# Patient Record
Sex: MALE | Race: OTHER | NOT HISPANIC OR LATINO | URBAN - METROPOLITAN AREA
[De-identification: names, ages, dates, MRNs, and addresses within clinical notes are randomized per-mention and may not be internally consistent; named-entity substitution may affect disease eponyms.]

---

## 2020-10-30 ENCOUNTER — INPATIENT (INPATIENT)
Facility: HOSPITAL | Age: 75
LOS: 3 days | Discharge: HOME | DRG: 149 | End: 2020-11-03
Attending: INTERNAL MEDICINE | Admitting: INTERNAL MEDICINE
Payer: MEDICARE

## 2020-10-30 VITALS
WEIGHT: 156.53 LBS | HEART RATE: 91 BPM | DIASTOLIC BLOOD PRESSURE: 79 MMHG | HEIGHT: 65 IN | SYSTOLIC BLOOD PRESSURE: 132 MMHG | OXYGEN SATURATION: 99 % | RESPIRATION RATE: 18 BRPM | TEMPERATURE: 98 F

## 2020-10-30 LAB
ALBUMIN SERPL ELPH-MCNC: 4 G/DL — SIGNIFICANT CHANGE UP (ref 3.5–5)
ALP SERPL-CCNC: 81 U/L — SIGNIFICANT CHANGE UP (ref 40–120)
ALT FLD-CCNC: 41 U/L DA — SIGNIFICANT CHANGE UP (ref 10–60)
ANION GAP SERPL CALC-SCNC: 10 MMOL/L — SIGNIFICANT CHANGE UP (ref 5–17)
APTT BLD: 28.4 SEC — SIGNIFICANT CHANGE UP (ref 27.5–35.5)
AST SERPL-CCNC: 21 U/L — SIGNIFICANT CHANGE UP (ref 10–40)
BASOPHILS # BLD AUTO: 0 K/UL — SIGNIFICANT CHANGE UP (ref 0–0.2)
BASOPHILS NFR BLD AUTO: 0 % — SIGNIFICANT CHANGE UP (ref 0–2)
BILIRUB SERPL-MCNC: 0.6 MG/DL — SIGNIFICANT CHANGE UP (ref 0.2–1.2)
BUN SERPL-MCNC: 21 MG/DL — HIGH (ref 7–18)
CALCIUM SERPL-MCNC: 9.2 MG/DL — SIGNIFICANT CHANGE UP (ref 8.4–10.5)
CHLORIDE SERPL-SCNC: 100 MMOL/L — SIGNIFICANT CHANGE UP (ref 96–108)
CO2 SERPL-SCNC: 28 MMOL/L — SIGNIFICANT CHANGE UP (ref 22–31)
CREAT SERPL-MCNC: 1.35 MG/DL — HIGH (ref 0.5–1.3)
ELLIPTOCYTES BLD QL SMEAR: SIGNIFICANT CHANGE UP
EOSINOPHIL # BLD AUTO: 0 K/UL — SIGNIFICANT CHANGE UP (ref 0–0.5)
EOSINOPHIL NFR BLD AUTO: 0 % — SIGNIFICANT CHANGE UP (ref 0–6)
GLUCOSE SERPL-MCNC: 177 MG/DL — HIGH (ref 70–99)
HCT VFR BLD CALC: 43.9 % — SIGNIFICANT CHANGE UP (ref 39–50)
HGB BLD-MCNC: 14.4 G/DL — SIGNIFICANT CHANGE UP (ref 13–17)
INR BLD: 1.12 RATIO — SIGNIFICANT CHANGE UP (ref 0.88–1.16)
LYMPHOCYTES # BLD AUTO: 0.48 K/UL — LOW (ref 1–3.3)
LYMPHOCYTES # BLD AUTO: 5 % — LOW (ref 13–44)
MANUAL SMEAR VERIFICATION: SIGNIFICANT CHANGE UP
MCHC RBC-ENTMCNC: 27.6 PG — SIGNIFICANT CHANGE UP (ref 27–34)
MCHC RBC-ENTMCNC: 32.8 GM/DL — SIGNIFICANT CHANGE UP (ref 32–36)
MCV RBC AUTO: 84.1 FL — SIGNIFICANT CHANGE UP (ref 80–100)
MONOCYTES # BLD AUTO: 0.38 K/UL — SIGNIFICANT CHANGE UP (ref 0–0.9)
MONOCYTES NFR BLD AUTO: 4 % — SIGNIFICANT CHANGE UP (ref 2–14)
NEUTROPHILS # BLD AUTO: 8.66 K/UL — HIGH (ref 1.8–7.4)
NEUTROPHILS NFR BLD AUTO: 90 % — HIGH (ref 43–77)
NRBC # BLD: 0 /100 — SIGNIFICANT CHANGE UP (ref 0–0)
NT-PROBNP SERPL-SCNC: 40 PG/ML — SIGNIFICANT CHANGE UP (ref 0–450)
PLAT MORPH BLD: NORMAL — SIGNIFICANT CHANGE UP
PLATELET # BLD AUTO: 267 K/UL — SIGNIFICANT CHANGE UP (ref 150–400)
POIKILOCYTOSIS BLD QL AUTO: SIGNIFICANT CHANGE UP
POTASSIUM SERPL-MCNC: 3.2 MMOL/L — LOW (ref 3.5–5.3)
POTASSIUM SERPL-SCNC: 3.2 MMOL/L — LOW (ref 3.5–5.3)
PROT SERPL-MCNC: 8.3 G/DL — SIGNIFICANT CHANGE UP (ref 6–8.3)
PROTHROM AB SERPL-ACNC: 13.2 SEC — SIGNIFICANT CHANGE UP (ref 10.6–13.6)
RBC # BLD: 5.22 M/UL — SIGNIFICANT CHANGE UP (ref 4.2–5.8)
RBC # FLD: 13.6 % — SIGNIFICANT CHANGE UP (ref 10.3–14.5)
RBC BLD AUTO: ABNORMAL
SCHISTOCYTES BLD QL AUTO: SLIGHT — SIGNIFICANT CHANGE UP
SODIUM SERPL-SCNC: 138 MMOL/L — SIGNIFICANT CHANGE UP (ref 135–145)
TROPONIN I SERPL-MCNC: <0.015 NG/ML — SIGNIFICANT CHANGE UP (ref 0–0.04)
VARIANT LYMPHS # BLD: 1 % — SIGNIFICANT CHANGE UP (ref 0–6)
WBC # BLD: 9.62 K/UL — SIGNIFICANT CHANGE UP (ref 3.8–10.5)
WBC # FLD AUTO: 9.62 K/UL — SIGNIFICANT CHANGE UP (ref 3.8–10.5)

## 2020-10-30 RX ORDER — POTASSIUM CHLORIDE 20 MEQ
40 PACKET (EA) ORAL ONCE
Refills: 0 | Status: COMPLETED | OUTPATIENT
Start: 2020-10-30 | End: 2020-10-30

## 2020-10-30 RX ORDER — DIPHENHYDRAMINE HCL 50 MG
25 CAPSULE ORAL ONCE
Refills: 0 | Status: COMPLETED | OUTPATIENT
Start: 2020-10-30 | End: 2020-10-30

## 2020-10-30 RX ORDER — MECLIZINE HCL 12.5 MG
25 TABLET ORAL ONCE
Refills: 0 | Status: COMPLETED | OUTPATIENT
Start: 2020-10-30 | End: 2020-10-30

## 2020-10-30 RX ORDER — SODIUM CHLORIDE 9 MG/ML
1000 INJECTION INTRAMUSCULAR; INTRAVENOUS; SUBCUTANEOUS ONCE
Refills: 0 | Status: COMPLETED | OUTPATIENT
Start: 2020-10-30 | End: 2020-10-30

## 2020-10-30 RX ORDER — SODIUM CHLORIDE 9 MG/ML
3 INJECTION INTRAMUSCULAR; INTRAVENOUS; SUBCUTANEOUS EVERY 8 HOURS
Refills: 0 | Status: DISCONTINUED | OUTPATIENT
Start: 2020-10-30 | End: 2020-11-03

## 2020-10-30 RX ADMIN — Medication 25 MILLIGRAM(S): at 23:01

## 2020-10-30 RX ADMIN — SODIUM CHLORIDE 1000 MILLILITER(S): 9 INJECTION INTRAMUSCULAR; INTRAVENOUS; SUBCUTANEOUS at 23:01

## 2020-10-30 NOTE — ED PROVIDER NOTE - CLINICAL SUMMARY MEDICAL DECISION MAKING FREE TEXT BOX
Pt p/w vertigo and nausea worse with movement and not currently present with no other s/s of neuro or cardiac etiology. Pt allergic to contrast (vomiting) therefore unable to perform CTA however given hx and benign exam low suspicion of central vertigo, will perform CT w/o contrast. Giving fluids and meclizine with benadryl. Pt stable. Will reassess. Pt p/w vertigo and nausea worse with movement and not currently present with no other s/s of neuro or cardiac etiology. Pt allergic to contrast (vomiting) therefore unable to perform CTA however given hx and benign exam low suspicion of central vertigo, will perform CT w/o contrast. Giving fluids and meclizine with benadryl. Pt stable. Will reassess.  EKG showing new inf and lat TWIs and pt still c/o intermittent vertigo s/p meds and fluids in the ED. Labs show Cr 1.3, uncertain if baseline. Will admit for further cardiac w/u +/- MRI brain. Endorsed to MAR.

## 2020-10-30 NOTE — ED ADULT NURSE NOTE - NSIMPLEMENTINTERV_GEN_ALL_ED
Implemented All Fall Risk Interventions:  [place] to call system. Call bell, personal items and telephone within reach. Instruct patient to call for assistance. Room bathroom lighting operational. Non-slip footwear when patient is off stretcher. Physically safe environment: no spills, clutter or unnecessary equipment. Stretcher in lowest position, wheels locked, appropriate side rails in place. Provide visual cue, wrist band, yellow gown, etc. Monitor gait and stability. Monitor for mental status changes and reorient to person, place, and time. Review medications for side effects contributing to fall risk. Reinforce activity limits and safety measures with patient and family.

## 2020-10-30 NOTE — ED PROVIDER NOTE - NS ED ROS FT
Patient Reports No  Fever/Chills  Diarrhea  Urinary Symptoms  Chest Pain, Shortness of Breath  Syncope, Orthopnea  Focal Numbness or Weakness  Other Recent Illness or Hospitalizations

## 2020-10-30 NOTE — ED PROVIDER NOTE - PHYSICAL EXAMINATION
Vital Signs Reviewed  GEN: Comfortable, Conversant in full sentences, NAD  HEENT: NCAT, MMM, Neck Supple  RESP: CTAB, No rales/rhonchi/wheezing  CV: RRR, S1S2, No murmurs  ABD: No TTP, ND, No masses  Extrem/Skin: Equal pulses bilat, No cyanosis/edema/rashes  Neuro: AAOx3, CNs Grossly Intact, Nml coordinating mvmts, Equal strength/sensation in all extremities bilat, No Pronator Drift, Shuffling gait (baseline per wife and pt)

## 2020-10-30 NOTE — ED ADULT TRIAGE NOTE - CHIEF COMPLAINT QUOTE
wife states " he is dizzy and vomited, started today ", denies headache   denies contact with person known to have corona virus

## 2020-10-30 NOTE — ED PROVIDER NOTE - OBJECTIVE STATEMENT
74 yo M h/o HTN, HLD, prostate CA (no active dz, last radiation in 2016) p/w dizziness described as vertigo worse with movement since this morning and 3 x NBNB emesis over the day. Pt states that he's had numerous similar episodes to this in the past where he has vertigo with nausea that responds spontaneously and denies h/o CVA. Currently pt states that he has no vertigo or other symptoms, even with movement during the exam, and that it resolved in the ambulance with no interventions given. Pt denies decreased PO intake, ear pain or auditory symptoms, diarrhea, urinary symptoms, headache, neck stiffness, fever or infectious symptoms, syncope, chest pain, SOB, focal numbness/weakness, bloody or black stools. No other recent illness or hospitalizations.

## 2020-10-31 ENCOUNTER — TRANSCRIPTION ENCOUNTER (OUTPATIENT)
Age: 75
End: 2020-10-31

## 2020-10-31 DIAGNOSIS — Z29.9 ENCOUNTER FOR PROPHYLACTIC MEASURES, UNSPECIFIED: ICD-10-CM

## 2020-10-31 DIAGNOSIS — R42 DIZZINESS AND GIDDINESS: ICD-10-CM

## 2020-10-31 DIAGNOSIS — E78.5 HYPERLIPIDEMIA, UNSPECIFIED: ICD-10-CM

## 2020-10-31 DIAGNOSIS — N17.9 ACUTE KIDNEY FAILURE, UNSPECIFIED: ICD-10-CM

## 2020-10-31 DIAGNOSIS — I10 ESSENTIAL (PRIMARY) HYPERTENSION: ICD-10-CM

## 2020-10-31 LAB
A1C WITH ESTIMATED AVERAGE GLUCOSE RESULT: 5.9 % — HIGH (ref 4–5.6)
ALBUMIN SERPL ELPH-MCNC: 3.5 G/DL — SIGNIFICANT CHANGE UP (ref 3.5–5)
ALP SERPL-CCNC: 69 U/L — SIGNIFICANT CHANGE UP (ref 40–120)
ALT FLD-CCNC: 36 U/L DA — SIGNIFICANT CHANGE UP (ref 10–60)
ANION GAP SERPL CALC-SCNC: 7 MMOL/L — SIGNIFICANT CHANGE UP (ref 5–17)
AST SERPL-CCNC: 18 U/L — SIGNIFICANT CHANGE UP (ref 10–40)
BASOPHILS # BLD AUTO: 0.02 K/UL — SIGNIFICANT CHANGE UP (ref 0–0.2)
BASOPHILS NFR BLD AUTO: 0.2 % — SIGNIFICANT CHANGE UP (ref 0–2)
BILIRUB SERPL-MCNC: 0.5 MG/DL — SIGNIFICANT CHANGE UP (ref 0.2–1.2)
BUN SERPL-MCNC: 18 MG/DL — SIGNIFICANT CHANGE UP (ref 7–18)
CALCIUM SERPL-MCNC: 9 MG/DL — SIGNIFICANT CHANGE UP (ref 8.4–10.5)
CHLORIDE SERPL-SCNC: 104 MMOL/L — SIGNIFICANT CHANGE UP (ref 96–108)
CHLORIDE UR-SCNC: 62 MMOL/L — SIGNIFICANT CHANGE UP
CHOLEST SERPL-MCNC: 139 MG/DL — SIGNIFICANT CHANGE UP
CO2 SERPL-SCNC: 30 MMOL/L — SIGNIFICANT CHANGE UP (ref 22–31)
CREAT ?TM UR-MCNC: 83 MG/DL — SIGNIFICANT CHANGE UP
CREAT SERPL-MCNC: 1.07 MG/DL — SIGNIFICANT CHANGE UP (ref 0.5–1.3)
EOSINOPHIL # BLD AUTO: 0.03 K/UL — SIGNIFICANT CHANGE UP (ref 0–0.5)
EOSINOPHIL NFR BLD AUTO: 0.3 % — SIGNIFICANT CHANGE UP (ref 0–6)
ESTIMATED AVERAGE GLUCOSE: 123 MG/DL — HIGH (ref 68–114)
GLUCOSE SERPL-MCNC: 98 MG/DL — SIGNIFICANT CHANGE UP (ref 70–99)
HCT VFR BLD CALC: 39.5 % — SIGNIFICANT CHANGE UP (ref 39–50)
HDLC SERPL-MCNC: 63 MG/DL — SIGNIFICANT CHANGE UP
HGB BLD-MCNC: 13.2 G/DL — SIGNIFICANT CHANGE UP (ref 13–17)
IMM GRANULOCYTES NFR BLD AUTO: 0.2 % — SIGNIFICANT CHANGE UP (ref 0–1.5)
LIPID PNL WITH DIRECT LDL SERPL: 66 MG/DL — SIGNIFICANT CHANGE UP
LYMPHOCYTES # BLD AUTO: 1.05 K/UL — SIGNIFICANT CHANGE UP (ref 1–3.3)
LYMPHOCYTES # BLD AUTO: 10.7 % — LOW (ref 13–44)
MAGNESIUM SERPL-MCNC: 2.1 MG/DL — SIGNIFICANT CHANGE UP (ref 1.6–2.6)
MCHC RBC-ENTMCNC: 28 PG — SIGNIFICANT CHANGE UP (ref 27–34)
MCHC RBC-ENTMCNC: 33.4 GM/DL — SIGNIFICANT CHANGE UP (ref 32–36)
MCV RBC AUTO: 83.9 FL — SIGNIFICANT CHANGE UP (ref 80–100)
MONOCYTES # BLD AUTO: 0.72 K/UL — SIGNIFICANT CHANGE UP (ref 0–0.9)
MONOCYTES NFR BLD AUTO: 7.3 % — SIGNIFICANT CHANGE UP (ref 2–14)
NEUTROPHILS # BLD AUTO: 7.98 K/UL — HIGH (ref 1.8–7.4)
NEUTROPHILS NFR BLD AUTO: 81.3 % — HIGH (ref 43–77)
NON HDL CHOLESTEROL: 76 MG/DL — SIGNIFICANT CHANGE UP
NRBC # BLD: 0 /100 WBCS — SIGNIFICANT CHANGE UP (ref 0–0)
OSMOLALITY UR: 483 MOS/KG — SIGNIFICANT CHANGE UP (ref 50–1200)
PHOSPHATE SERPL-MCNC: 1.9 MG/DL — LOW (ref 2.5–4.5)
PLATELET # BLD AUTO: 248 K/UL — SIGNIFICANT CHANGE UP (ref 150–400)
POTASSIUM SERPL-MCNC: 3.4 MMOL/L — LOW (ref 3.5–5.3)
POTASSIUM SERPL-SCNC: 3.4 MMOL/L — LOW (ref 3.5–5.3)
PROT SERPL-MCNC: 7.4 G/DL — SIGNIFICANT CHANGE UP (ref 6–8.3)
RBC # BLD: 4.71 M/UL — SIGNIFICANT CHANGE UP (ref 4.2–5.8)
RBC # FLD: 13.8 % — SIGNIFICANT CHANGE UP (ref 10.3–14.5)
SARS-COV-2 RNA SPEC QL NAA+PROBE: SIGNIFICANT CHANGE UP
SODIUM SERPL-SCNC: 141 MMOL/L — SIGNIFICANT CHANGE UP (ref 135–145)
SODIUM UR-SCNC: 50 MMOL/L — SIGNIFICANT CHANGE UP
TRIGL SERPL-MCNC: 49 MG/DL — SIGNIFICANT CHANGE UP
TROPONIN I SERPL-MCNC: <0.015 NG/ML — SIGNIFICANT CHANGE UP (ref 0–0.04)
TSH SERPL-MCNC: 0.85 UU/ML — SIGNIFICANT CHANGE UP (ref 0.34–4.82)
WBC # BLD: 9.82 K/UL — SIGNIFICANT CHANGE UP (ref 3.8–10.5)
WBC # FLD AUTO: 9.82 K/UL — SIGNIFICANT CHANGE UP (ref 3.8–10.5)

## 2020-10-31 PROCEDURE — 70450 CT HEAD/BRAIN W/O DYE: CPT | Mod: 26

## 2020-10-31 PROCEDURE — 93880 EXTRACRANIAL BILAT STUDY: CPT | Mod: 26

## 2020-10-31 PROCEDURE — 99283 EMERGENCY DEPT VISIT LOW MDM: CPT

## 2020-10-31 RX ORDER — SIMVASTATIN 20 MG/1
10 TABLET, FILM COATED ORAL AT BEDTIME
Refills: 0 | Status: DISCONTINUED | OUTPATIENT
Start: 2020-10-31 | End: 2020-11-03

## 2020-10-31 RX ORDER — ONDANSETRON 8 MG/1
4 TABLET, FILM COATED ORAL EVERY 4 HOURS
Refills: 0 | Status: DISCONTINUED | OUTPATIENT
Start: 2020-10-31 | End: 2020-11-03

## 2020-10-31 RX ORDER — POTASSIUM CHLORIDE 20 MEQ
40 PACKET (EA) ORAL EVERY 4 HOURS
Refills: 0 | Status: COMPLETED | OUTPATIENT
Start: 2020-10-31 | End: 2020-10-31

## 2020-10-31 RX ORDER — POTASSIUM PHOSPHATE, MONOBASIC POTASSIUM PHOSPHATE, DIBASIC 236; 224 MG/ML; MG/ML
15 INJECTION, SOLUTION INTRAVENOUS ONCE
Refills: 0 | Status: DISCONTINUED | OUTPATIENT
Start: 2020-10-31 | End: 2020-10-31

## 2020-10-31 RX ORDER — INFLUENZA VIRUS VACCINE 15; 15; 15; 15 UG/.5ML; UG/.5ML; UG/.5ML; UG/.5ML
0.5 SUSPENSION INTRAMUSCULAR ONCE
Refills: 0 | Status: COMPLETED | OUTPATIENT
Start: 2020-10-31 | End: 2020-10-31

## 2020-10-31 RX ORDER — HEPARIN SODIUM 5000 [USP'U]/ML
5000 INJECTION INTRAVENOUS; SUBCUTANEOUS ONCE
Refills: 0 | Status: DISCONTINUED | OUTPATIENT
Start: 2020-10-31 | End: 2020-11-01

## 2020-10-31 RX ORDER — FAMOTIDINE 10 MG/ML
20 INJECTION INTRAVENOUS DAILY
Refills: 0 | Status: DISCONTINUED | OUTPATIENT
Start: 2020-10-31 | End: 2020-11-03

## 2020-10-31 RX ORDER — SODIUM CHLORIDE 9 MG/ML
1000 INJECTION INTRAMUSCULAR; INTRAVENOUS; SUBCUTANEOUS
Refills: 0 | Status: DISCONTINUED | OUTPATIENT
Start: 2020-10-31 | End: 2020-11-03

## 2020-10-31 RX ORDER — AMLODIPINE BESYLATE 2.5 MG/1
5 TABLET ORAL DAILY
Refills: 0 | Status: DISCONTINUED | OUTPATIENT
Start: 2020-10-31 | End: 2020-11-01

## 2020-10-31 RX ORDER — POTASSIUM PHOSPHATE, MONOBASIC POTASSIUM PHOSPHATE, DIBASIC 236; 224 MG/ML; MG/ML
15 INJECTION, SOLUTION INTRAVENOUS ONCE
Refills: 0 | Status: COMPLETED | OUTPATIENT
Start: 2020-10-31 | End: 2020-10-31

## 2020-10-31 RX ORDER — MECLIZINE HCL 12.5 MG
12.5 TABLET ORAL EVERY 6 HOURS
Refills: 0 | Status: DISCONTINUED | OUTPATIENT
Start: 2020-10-31 | End: 2020-11-03

## 2020-10-31 RX ADMIN — Medication 40 MILLIEQUIVALENT(S): at 10:17

## 2020-10-31 RX ADMIN — SODIUM CHLORIDE 3 MILLILITER(S): 9 INJECTION INTRAMUSCULAR; INTRAVENOUS; SUBCUTANEOUS at 13:46

## 2020-10-31 RX ADMIN — FAMOTIDINE 20 MILLIGRAM(S): 10 INJECTION INTRAVENOUS at 11:13

## 2020-10-31 RX ADMIN — SODIUM CHLORIDE 60 MILLILITER(S): 9 INJECTION INTRAMUSCULAR; INTRAVENOUS; SUBCUTANEOUS at 04:48

## 2020-10-31 RX ADMIN — SIMVASTATIN 10 MILLIGRAM(S): 20 TABLET, FILM COATED ORAL at 22:24

## 2020-10-31 RX ADMIN — POTASSIUM PHOSPHATE, MONOBASIC POTASSIUM PHOSPHATE, DIBASIC 62.5 MILLIMOLE(S): 236; 224 INJECTION, SOLUTION INTRAVENOUS at 10:17

## 2020-10-31 RX ADMIN — Medication 40 MILLIEQUIVALENT(S): at 13:46

## 2020-10-31 RX ADMIN — SODIUM CHLORIDE 3 MILLILITER(S): 9 INJECTION INTRAMUSCULAR; INTRAVENOUS; SUBCUTANEOUS at 06:02

## 2020-10-31 RX ADMIN — AMLODIPINE BESYLATE 5 MILLIGRAM(S): 2.5 TABLET ORAL at 05:43

## 2020-10-31 RX ADMIN — SODIUM CHLORIDE 3 MILLILITER(S): 9 INJECTION INTRAMUSCULAR; INTRAVENOUS; SUBCUTANEOUS at 21:28

## 2020-10-31 RX ADMIN — SODIUM CHLORIDE 3 MILLILITER(S): 9 INJECTION INTRAMUSCULAR; INTRAVENOUS; SUBCUTANEOUS at 00:04

## 2020-10-31 RX ADMIN — Medication 40 MILLIEQUIVALENT(S): at 01:24

## 2020-10-31 NOTE — H&P ADULT - ASSESSMENT
76 yo M, from home, self ambulatory with PMH OF   HTN, HLD, prostate CA ( s/p Radz,  last radiation in 2016) p/w dizziness x 2 days. Pt was in his usual state of health until2 days ago and then developed sudden onset of Vertigo when he woke up from sleep.      In  the ED,   Pt is AAOX3, no signs of any distress  Vitals- 132/76, Hr 92, afebrile  K+ 3.2, replaced with 40 meq KCL   Cr 1.3  CT head - no acute changes  EKG- t wave inversions- v4-v6, III and avf  Trops x1 negative

## 2020-10-31 NOTE — H&P ADULT - HISTORY OF PRESENT ILLNESS
76 yo M, from home, self ambulatory with PMH OF   HTN, HLD, prostate CA ( s/p Radz,  last radiation in 2016) p/w dizziness x 2 days. Pt was in his usual state of health until2 days ago and then developed sudden onset of Vertigo when he woke up from sleep. He Describes his vertigo be be worsened  with movement since this morning. He also endorses Nausea  associated with  3  episodes of  NBNB emesis over the day. Pt states that he's had numerous similar episodes to this in the past where he has vertigo with nausea that responds spontaneously and denies h/o CVA. Pt usually walks without any assistance but has limited his activity due to the vertigo. Currently pt states that he has no vertigo or other symptoms, even with movement during the exam, and that it resolved in the ambulance with no interventions given. Pt had a similar presentation 5 years ago and was hospitalized.  Pt denies decreased PO intake, ear pain, tinnitus,  or auditory symptoms, diarrhea, urinary symptoms, headache, neck stiffness, fever or infectious symptoms, syncope, chest pain, SOB, focal numbness.   Pt denies any smoking, alcohol or any form of substance abuse.    In  the ED,   Pt is AAOX3, no signs of any distress  Vitals- 132/76, Hr 92, afebrile  K+ 3.2, replaced with 40 meq KCL   Cr 1.3  CT head - no acute changes  EKG- t wave inversions- v4-v6, III and avf  Trops x1 negative

## 2020-10-31 NOTE — H&P ADULT - PROBLEM SELECTOR PLAN 3
Pt has PMH of HTN   Home meds- losartan/hctz and amlodipine   c/w amlodipine   holding Losartan/hctz for shakeel

## 2020-10-31 NOTE — H&P ADULT - PROBLEM SELECTOR PLAN 1
Pt admitted for Vertigo worsening with positional changes x2 days.  Pt denies any fall, LOC, Tinnitus   CT head- no acute changes   Pt has no active vertigo in ED   Neuro consult- Dr. Fatima Pt admitted for Vertigo worsening with positional changes x2 days.  Pt denies any fall, LOC, Tinnitus   CT head- no acute changes   Pt has no active vertigo in ED   Neuro consult- Dr. Izquierdo

## 2020-10-31 NOTE — H&P ADULT - NSHPLABSRESULTS_GEN_ALL_CORE
14.4   9.62  )-----------( 267      ( 30 Oct 2020 23:09 )             43.9       10-30    138  |  100  |  21<H>  ----------------------------<  177<H>  3.2<L>   |  28  |  1.35<H>    Ca    9.2      30 Oct 2020 23:09    TPro  8.3  /  Alb  4.0  /  TBili  0.6  /  DBili  x   /  AST  21  /  ALT  41  /  AlkPhos  81  10-30                  PT/INR - ( 30 Oct 2020 23:09 )   PT: 13.2 sec;   INR: 1.12 ratio         PTT - ( 30 Oct 2020 23:09 )  PTT:28.4 sec    Lactate Trend      CARDIAC MARKERS ( 30 Oct 2020 23:09 )  <0.015 ng/mL / x     / x     / x     / x            CAPILLARY BLOOD GLUCOSE          c< from: CT Head No Cont (10.31.20 @ 01:11) >    Impression: No CT evidence of acute intracranial abnormality.    < end of copied text >

## 2020-10-31 NOTE — CONSULT NOTE ADULT - SUBJECTIVE AND OBJECTIVE BOX
CHIEF COMPLAINT:Patient is a 75y old  Male who presents with a chief complaint of Vertigo.      HPI:  74 yo M, from home, self ambulatory with PMH OF   HTN, HLD, prostate CA ( s/p Radz,  last radiation in 2016) p/w dizziness x 2 days. Pt was in his usual state of health until2 days ago and then developed sudden onset of Vertigo when he woke up from sleep. He Describes his vertigo be be worsened  with movement since this morning. He also endorses Nausea  associated with  3  episodes of  NBNB emesis over the day. Pt states that he's had numerous similar episodes to this in the past where he has vertigo with nausea that responds spontaneously and denies h/o CVA. Pt usually walks without any assistance but has limited his activity due to the vertigo. Currently pt states that he has no vertigo or other symptoms, even with movement during the exam, and that it resolved in the ambulance with no interventions given. Pt had a similar presentation 5 years ago and was hospitalized.  Pt denies decreased PO intake, ear pain, tinnitus,  or auditory symptoms, diarrhea, urinary symptoms, headache, neck stiffness, fever or infectious symptoms, syncope, chest pain, SOB, focal numbness.   Pt denies any smoking, alcohol or any form of substance abuse.    In  the ED,   Pt is AAOX3, no signs of any distress  Vitals- 132/76, Hr 92, afebrile  K+ 3.2, replaced with 40 meq KCL   Cr 1.3  CT head - no acute changes  EKG- t wave inversions- v4-v6, III and avf  Trops x1 negative  (31 Oct 2020 03:26)      PAST MEDICAL & SURGICAL HISTORY:  Prostate cancer  s/p radiotherapy in Nov 2015    HTN (hypertension)    HLD (hyperlipidemia)        MEDICATIONS  (STANDING):  amLODIPine   Tablet 5 milliGRAM(s) Oral daily  famotidine    Tablet 20 milliGRAM(s) Oral daily  heparin   Injectable 5000 Unit(s) IV Push once  influenza   Vaccine 0.5 milliLiter(s) IntraMuscular once  potassium chloride    Tablet ER 40 milliEquivalent(s) Oral every 4 hours  potassium phosphate IVPB 15 milliMole(s) IV Intermittent once  simvastatin 10 milliGRAM(s) Oral at bedtime  sodium chloride 0.9% lock flush 3 milliLiter(s) IV Push every 8 hours  sodium chloride 0.9%. 1000 milliLiter(s) (60 mL/Hr) IV Continuous <Continuous>    MEDICATIONS  (PRN):  meclizine 12.5 milliGRAM(s) Oral every 6 hours PRN vertigo  ondansetron Injectable 4 milliGRAM(s) IV Push every 4 hours PRN Nausea and/or Vomiting      FAMILY HISTORY:No hx of CAD      SOCIAL HISTORY:    [x ] Non-smoker    [x ] Alcohol-denies    Allergies    No Known Allergies    Intolerances    	    REVIEW OF SYSTEMS:  CONSTITUTIONAL: No fever, weight loss, or fatigue  EYES: No eye pain, visual disturbances, or discharge  ENT:  No difficulty hearing, tinnitus, vertigo; No sinus or throat pain  NECK: No pain or stiffness  RESPIRATORY: No cough, wheezing, chills or hemoptysis; No Shortness of Breath  CARDIOVASCULAR: No chest pain, palpitations, passing out,+ dizziness  GASTROINTESTINAL: No abdominal or epigastric pain. No nausea, vomiting, or hematemesis; No diarrhea or constipation. No melena or hematochezia.  GENITOURINARY: No dysuria, frequency, hematuria, or incontinence  NEUROLOGICAL: No headaches, memory loss, loss of strength, numbness, or tremors  SKIN: No itching, burning, rashes, or lesions   LYMPH Nodes: No enlarged glands  ENDOCRINE: No heat or cold intolerance; No hair loss  MUSCULOSKELETAL: No joint pain or swelling; No muscle, back, or extremity pain  PSYCHIATRIC: No depression, anxiety, mood swings, or difficulty sleeping  HEME/LYMPH: No easy bruising, or bleeding gums  ALLERGY AND IMMUNOLOGIC: No hives or eczema	      PHYSICAL EXAM:  T(C): 36.8 (10-31-20 @ 07:27), Max: 36.8 (10-31-20 @ 07:27)  HR: 75 (10-31-20 @ 07:27) (75 - 97)  BP: 136/73 (10-31-20 @ 07:27) (132/74 - 145/84)  RR: 18 (10-31-20 @ 07:27) (17 - 19)  SpO2: 99% (10-31-20 @ 07:27) (98% - 100%)        Appearance: Normal	  HEENT:   Normal oral mucosa, PERRL, EOMI	  Lymphatic: No lymphadenopathy  Cardiovascular: Normal S1 S2, No JVD, No murmurs, No edema  Respiratory: Lungs clear to auscultation	  Psychiatry: A & O x 3, Mood & affect appropriate  Gastrointestinal:  Soft, Non-tender, + BS	  Skin: No rashes, No ecchymoses, No cyanosis	  Neurologic: Non-focal  Extremities: Normal range of motion, No clubbing, cyanosis or edema  Vascular: Peripheral pulses palpable 2+ bilaterally    	    ECG:  NSR with t wave inversion serge-lateral leads	  	  	  LABS:	 	      CARDIAC MARKERS ( 31 Oct 2020 05:44 )  <0.015 ng/mL / x     / x     / x     / x      CARDIAC MARKERS ( 30 Oct 2020 23:09 )  <0.015 ng/mL / x     / x     / x     / x                                  13.2   9.82  )-----------( 248      ( 31 Oct 2020 05:44 )             39.5     10-31    141  |  104  |  18  ----------------------------<  98  3.4<L>   |  30  |  1.07    Ca    9.0      31 Oct 2020 05:44  Phos  1.9     10-31  Mg     2.1     10-31    TPro  7.4  /  Alb  3.5  /  TBili  0.5  /  DBili  x   /  AST  18  /  ALT  36  /  AlkPhos  69  10-31    proBNP: Serum Pro-Brain Natriuretic Peptide: 40 pg/mL (10-30 @ 23:09)    Lipid Profile: Cholesterol 139  LDL --  HDL 63  TG 49      TSH: Thyroid Stimulating Hormone, Serum: 0.85 uU/mL (10-31 @ 05:44)      rad< from: CT Head No Cont (10.31.20 @ 01:11) >  EXAM:  CT BRAIN                            PROCEDURE DATE:  10/31/2020          INTERPRETATION:  Clinical Indication: Vertigo.    Comparison: 9/11/2011    Technique: Noncontrast axial CT images of the head were acquired. Coronal and sagittal reformats were obtained.    Findings:  The ventricles and sulci are prominent in size, compatible with mild generalized parenchymal volume loss. No acute intracranial hemorrhage is identified.  No extra-axial fluid collection is identified.  No mass effect or midline shift is seen.  There is no evidence of acute territorial infarct.  There are periventricular and subcortical white matter hypodensities, which are nonspecific, but likely reflect mild microvascular ischemic disease.  There is scattered pansinus mucosal thickening. The mastoid air cells are well aerated.  No acute osseous abnormality is seen.      Impression: No CT evidence of acute intracranial abnormality.            AUGUSTO ARRINGTON MD; Attending Radiologist  This document has been electronically signed. Oct 31 2020  1:38AM    < end of copied text >

## 2020-10-31 NOTE — PHYSICAL THERAPY INITIAL EVALUATION ADULT - CRITERIA FOR SKILLED THERAPEUTIC INTERVENTIONS
impairments found/risk reduction/prevention/therapy frequency/functional limitations in following categories/predicted duration of therapy intervention/rehab potential/anticipated discharge recommendation

## 2020-10-31 NOTE — DISCHARGE NOTE PROVIDER - NSDCMRMEDTOKEN_GEN_ALL_CORE_FT
amLODIPine 5 mg oral tablet: 1 tab(s) orally once a day  losartan-hydrochlorothiazide 100 mg-25 mg oral tablet: 1 tab(s) orally once a day  simvastatin 10 mg oral tablet: 1 tab(s) orally once a day (at bedtime)   amLODIPine 5 mg oral tablet: 1 tab(s) orally once a day  aspirin 81 mg oral delayed release tablet: 1 tab(s) orally once a day  meclizine 12.5 mg oral tablet: 1 tab(s) orally prn, As Needed -vertigo   metoprolol tartrate 25 mg oral tablet: 1 tab(s) orally 2 times a day  simvastatin 10 mg oral tablet: 1 tab(s) orally once a day (at bedtime)

## 2020-10-31 NOTE — DISCHARGE NOTE PROVIDER - NSDCCPCAREPLAN_GEN_ALL_CORE_FT
PRINCIPAL DISCHARGE DIAGNOSIS  Diagnosis: Vertigo  Assessment and Plan of Treatment: You came to the hospital with complaints of dizziness. You were admitted to exclude any neurological or cardiological causes. EKG was normal sinus rhytm.  Echocardiogram showed ***  Your CT head was negative for any bleed. Ultrasound of the neck vessels was negative for any narrowing.      SECONDARY DISCHARGE DIAGNOSES  Diagnosis: Acute kidney injury  Assessment and Plan of Treatment: You presented with elevated creatinine likely due to  severe dehydration. It improved during your course. Please continue oral hydration as much as possible within the daily drinking limit of 2 L per day.       PRINCIPAL DISCHARGE DIAGNOSIS  Diagnosis: Vertigo  Assessment and Plan of Treatment: You came to the hospital with complaints of dizziness. You were admitted to exclude any neurological or cardiological causes. EKG was normal sinus rhythm. Echocardiogram showed normal functioning and no valvular pathologies. Stress test was negative for Ischemia in the heart. Cardiology Dr. Hugo. Your CT head was negative for any bleed. MRI brain and MR Internal auditory canals were normal. Ultrasound of the neck vessels was negative for any narrowing. Neurologist Dr. Izquierdo followed. Follow up with your primary care physician in a week.      SECONDARY DISCHARGE DIAGNOSES  Diagnosis: Acute kidney injury  Assessment and Plan of Treatment: You presented with elevated creatinine likely due to  severe dehydration. It improved during your course. Please continue oral hydration as much as possible within the daily drinking limit of 2 L per day.       PRINCIPAL DISCHARGE DIAGNOSIS  Diagnosis: Vertigo  Assessment and Plan of Treatment: You came to the hospital with complaints of dizziness. You were admitted to exclude any neurological or cardiological causes. EKG was normal sinus rhythm. Echocardiogram showed normal functioning and no valvular pathologies. Stress test was negative for Ischemia in the heart. Cardiology Dr. Hugo. Your CT head was negative for any bleed. MRI brain and MR Internal auditory canals were normal. Ultrasound of the neck vessels was negative for any narrowing. Neurologist Dr. Izquierdo followed. You were treated with aspirin, metoprolol, meclizine, and statin. Follow up with your primary care physician in a week.      SECONDARY DISCHARGE DIAGNOSES  Diagnosis: Acute kidney injury  Assessment and Plan of Treatment: You presented with elevated creatinine likely due to  severe dehydration. It improved during your course. Please continue oral hydration as much as possible within the daily drinking limit of 2 L per day.

## 2020-10-31 NOTE — PHYSICAL THERAPY INITIAL EVALUATION ADULT - GENERAL OBSERVATIONS, REHAB EVAL
Pt. received supine in bed, NAD, on telemetry. Pt. cooperative and motivated during eval. Pt. A&O x 3.

## 2020-10-31 NOTE — H&P ADULT - PROBLEM SELECTOR PLAN 2
Pt  on admission has mild Cr elevation 1.3  LINDSEY likely from dehydration   s/p 1L NS bolus in ED   f/u bmp and urine lytes in am

## 2020-10-31 NOTE — DISCHARGE NOTE PROVIDER - HOSPITAL COURSE
76 yo M, from home, self ambulatory with PMH OF   HTN, HLD, prostate CA ( s/p Radz,  last radiation in 2016) p/w dizziness x 2 days. Pt was in his usual state of health until2 days ago and then developed sudden onset of Vertigo when he woke up from sleep. He Describes his vertigo be be worsened  with movement since this morning. He also endorses Nausea  associated with  3  episodes of  NBNB emesis over the day. Pt states that he's had numerous similar episodes to this in the past where he has vertigo with nausea that responds spontaneously and denies h/o CVA. Pt usually walks without any assistance but has limited his activity due to the vertigo. Currently pt states that he has no vertigo or other symptoms, even with movement during the exam, and that it resolved in the ambulance with no interventions given. Pt had a similar presentation 5 years ago and was hospitalized.  Pt denies decreased PO intake, ear pain, tinnitus,  or auditory symptoms, diarrhea, urinary symptoms, headache, neck stiffness, fever or infectious symptoms, syncope, chest pain, SOB, focal numbness.   Pt denies any smoking, alcohol or any form of substance abuse. 74 yo M, from home, self ambulatory with PMH OF   HTN, HLD, prostate CA ( s/p Radz,  last radiation in 2016) p/w dizziness x 2 days. Pt was in his usual state of health until2 days ago and then developed sudden onset of Vertigo when he woke up from sleep. He Describes his vertigo be be worsened  with movement since this morning. He also endorses Nausea  associated with  3  episodes of  NBNB emesis over the day. Pt states that he's had numerous similar episodes to this in the past where he has vertigo with nausea that responds spontaneously and denies h/o CVA. Pt usually walks without any assistance but has limited his activity due to the vertigo. Currently pt states that he has no vertigo or other symptoms, even with movement during the exam, and that it resolved in the ambulance with no interventions given. Pt had a similar presentation 5 years ago and was hospitalized.  Pt denies decreased PO intake, ear pain, tinnitus,  or auditory symptoms, diarrhea, urinary symptoms, headache, neck stiffness, fever or infectious symptoms, syncope, chest pain, SOB, focal numbness. Pt denies any smoking, alcohol or any form of substance abuse. For the vertigo, EKG showed sinus rhythm with 1st degree A-V block, ST & T wave abnormality(anterolateral ischemia), On Tele monitoring, Orthostats negative, Stress test was normal, Cardiology Dr. Baca followed. CT head showed no acute changes, US doppler carotid negative, MRI head and MR IAC was normal, Neuro consult- Dr. Izquierdo. 74 yo M, from home, self ambulatory with PMH OF   HTN, HLD, prostate CA ( s/p Radz,  last radiation in 2016) p/w dizziness x 2 days. Pt was in his usual state of health until2 days ago and then developed sudden onset of Vertigo when he woke up from sleep. He Describes his vertigo be be worsened  with movement since this morning. He also endorses Nausea  associated with  3  episodes of  NBNB emesis over the day. Pt states that he's had numerous similar episodes to this in the past where he has vertigo with nausea that responds spontaneously and denies h/o CVA. Pt usually walks without any assistance but has limited his activity due to the vertigo. Currently pt states that he has no vertigo or other symptoms, even with movement during the exam, and that it resolved in the ambulance with no interventions given. Pt had a similar presentation 5 years ago and was hospitalized.  Pt denies decreased PO intake, ear pain, tinnitus,  or auditory symptoms, diarrhea, urinary symptoms, headache, neck stiffness, fever or infectious symptoms, syncope, chest pain, SOB, focal numbness. Pt denies any smoking, alcohol or any form of substance abuse. For the vertigo, EKG showed sinus rhythm with 1st degree A-V block, ST & T wave abnormality(anterolateral ischemia), On Tele monitoring, Orthostats negative, Stress test was normal, Cardiology Dr. Baca followed. CT head showed no acute changes, US doppler carotid negative, MRI head and MR IAC was normal, Neuro consult- Dr. Izquierdo.  Patient is stable for discharge per attending and is advised to follow up with PCP as outpatient  Please refer to patient's complete medical chart with documents for a full hospital course, for this is only a brief summary.

## 2020-10-31 NOTE — DISCHARGE NOTE PROVIDER - CARE PROVIDER_API CALL
[place] [address]  Phone: [phone]  Fax: [phone]  Follow Up Time: 1 week

## 2020-10-31 NOTE — H&P ADULT - ATTENDING COMMENTS
76 yo M, from home, self ambulatory with PMH OF   HTN, HLD, prostate CA ( s/p Radz,  last radiation in 2016) p/w dizziness x 2 days. Pt was in his usual state of health until2 days ago and then developed sudden onset of Vertigo when he woke up from sleep. He Describes his vertigo be be worsened  with movement since this morning. He also endorses Nausea  associated with  3  episodes of  NBNB emesis over the day. Pt states that he's had numerous similar episodes to this in the past where he has vertigo with nausea that responds spontaneously and denies h/o CVA. Pt usually walks without any assistance but has limited his activity due to the vertigo. Currently pt states that he has no vertigo or other symptoms, even with movement during the exam, and that it resolved in the ambulance with no interventions given. Pt had a similar presentation 5 years ago and was hospitalized.  Pt denies decreased PO intake, ear pain, tinnitus,  or auditory symptoms, diarrhea, urinary symptoms, headache, neck stiffness, fever or infectious symptoms, syncope, chest pain, SOB, focal numbness.   Pt denies any smoking, alcohol or any form of substance abuse.    In  the ED,   Pt is AAOX3, no signs of any distress  Vitals- 132/76, Hr 92, afebrile  K+ 3.2, replaced with 40 meq KCL   Cr 1.3  CT head - no acute changes  EKG- t wave inversions- v4-v6, III and avf  Trops x1 negative       assessment   --- vertigo, ecg changes r/o acs, r/o cns patho, shakeel, hypokalemia, h/o HTN, HLD, prostate CA ( s/p Radz,  last radiation in 2016)     plan  --  adm to tele, aspirin, statin, meclizine prn, cont preadmit home meds, gi and dvt profilaxis, supplement potassium, ivf  cbc, bmp, mg, phos, lipid, tsh, ce q8 x3    orthostatic bp q8hrs    echo  carotid duplex    cardio cons  neuro cons.

## 2020-10-31 NOTE — CONSULT NOTE ADULT - ASSESSMENT
74 yo M, from home, self ambulatory with PMH OF   HTN, HLD, prostate CA ( s/p Radz,  last radiation in 2016) p/w dizziness x 2 days. Pt was in his usual state of health until2 days ago and then developed sudden onset of Vertigo when he woke up from sleep,abnormal EKG,s/p LINDSEY.  1.Tele monitoring.  2.Orthostatic bp q shift.  3.Replace k+.  4.Echocardiogram.  5.HTN-Norvasc.  6.Lipid d/o-statin.  7.GI and DVT prophylaxis.

## 2020-10-31 NOTE — H&P ADULT - NSHPPHYSICALEXAM_GEN_ALL_CORE
Vital Signs (24 Hrs):  T(C): 36.5 (10-31-20 @ 00:54), Max: 36.5 (10-31-20 @ 00:54)  HR: 92 (10-31-20 @ 00:54) (91 - 92)  BP: 132/76 (10-31-20 @ 00:54) (132/76 - 132/79)  RR: 18 (10-31-20 @ 00:54) (18 - 18)  SpO2: 98% (10-31-20 @ 00:54) (98% - 99%)  Wt(kg): --  Daily Height in cm: 165.1 (30 Oct 2020 21:55)    Daily     I&O's Summary

## 2020-10-31 NOTE — PHYSICAL THERAPY INITIAL EVALUATION ADULT - PERTINENT HX OF CURRENT PROBLEM, REHAB EVAL
Pt. was in his usual state of health until 2 days PTA when he developed sudden onset of vertigo when he woke up from sleep. No CT evidence of acute intracranial abnormality.

## 2020-11-01 LAB
ALBUMIN SERPL ELPH-MCNC: 3.1 G/DL — LOW (ref 3.5–5)
ALP SERPL-CCNC: 62 U/L — SIGNIFICANT CHANGE UP (ref 40–120)
ALT FLD-CCNC: 32 U/L DA — SIGNIFICANT CHANGE UP (ref 10–60)
ANION GAP SERPL CALC-SCNC: 6 MMOL/L — SIGNIFICANT CHANGE UP (ref 5–17)
AST SERPL-CCNC: 27 U/L — SIGNIFICANT CHANGE UP (ref 10–40)
BASOPHILS # BLD AUTO: 0.06 K/UL — SIGNIFICANT CHANGE UP (ref 0–0.2)
BASOPHILS NFR BLD AUTO: 0.8 % — SIGNIFICANT CHANGE UP (ref 0–2)
BILIRUB SERPL-MCNC: 0.6 MG/DL — SIGNIFICANT CHANGE UP (ref 0.2–1.2)
BUN SERPL-MCNC: 13 MG/DL — SIGNIFICANT CHANGE UP (ref 7–18)
CALCIUM SERPL-MCNC: 8.8 MG/DL — SIGNIFICANT CHANGE UP (ref 8.4–10.5)
CHLORIDE SERPL-SCNC: 107 MMOL/L — SIGNIFICANT CHANGE UP (ref 96–108)
CO2 SERPL-SCNC: 30 MMOL/L — SIGNIFICANT CHANGE UP (ref 22–31)
CREAT SERPL-MCNC: 0.96 MG/DL — SIGNIFICANT CHANGE UP (ref 0.5–1.3)
EOSINOPHIL # BLD AUTO: 0.46 K/UL — SIGNIFICANT CHANGE UP (ref 0–0.5)
EOSINOPHIL NFR BLD AUTO: 6.3 % — HIGH (ref 0–6)
GLUCOSE SERPL-MCNC: 81 MG/DL — SIGNIFICANT CHANGE UP (ref 70–99)
HCT VFR BLD CALC: 38 % — LOW (ref 39–50)
HGB BLD-MCNC: 12.3 G/DL — LOW (ref 13–17)
IMM GRANULOCYTES NFR BLD AUTO: 0.3 % — SIGNIFICANT CHANGE UP (ref 0–1.5)
LYMPHOCYTES # BLD AUTO: 1.2 K/UL — SIGNIFICANT CHANGE UP (ref 1–3.3)
LYMPHOCYTES # BLD AUTO: 16.3 % — SIGNIFICANT CHANGE UP (ref 13–44)
MAGNESIUM SERPL-MCNC: 2.4 MG/DL — SIGNIFICANT CHANGE UP (ref 1.6–2.6)
MCHC RBC-ENTMCNC: 27.8 PG — SIGNIFICANT CHANGE UP (ref 27–34)
MCHC RBC-ENTMCNC: 32.4 GM/DL — SIGNIFICANT CHANGE UP (ref 32–36)
MCV RBC AUTO: 85.8 FL — SIGNIFICANT CHANGE UP (ref 80–100)
MONOCYTES # BLD AUTO: 0.63 K/UL — SIGNIFICANT CHANGE UP (ref 0–0.9)
MONOCYTES NFR BLD AUTO: 8.6 % — SIGNIFICANT CHANGE UP (ref 2–14)
NEUTROPHILS # BLD AUTO: 4.98 K/UL — SIGNIFICANT CHANGE UP (ref 1.8–7.4)
NEUTROPHILS NFR BLD AUTO: 67.7 % — SIGNIFICANT CHANGE UP (ref 43–77)
NRBC # BLD: 0 /100 WBCS — SIGNIFICANT CHANGE UP (ref 0–0)
PHOSPHATE SERPL-MCNC: 2.3 MG/DL — LOW (ref 2.5–4.5)
PLATELET # BLD AUTO: 234 K/UL — SIGNIFICANT CHANGE UP (ref 150–400)
POTASSIUM SERPL-MCNC: 3.8 MMOL/L — SIGNIFICANT CHANGE UP (ref 3.5–5.3)
POTASSIUM SERPL-SCNC: 3.8 MMOL/L — SIGNIFICANT CHANGE UP (ref 3.5–5.3)
PROT SERPL-MCNC: 6.6 G/DL — SIGNIFICANT CHANGE UP (ref 6–8.3)
RBC # BLD: 4.43 M/UL — SIGNIFICANT CHANGE UP (ref 4.2–5.8)
RBC # FLD: 14.2 % — SIGNIFICANT CHANGE UP (ref 10.3–14.5)
SARS-COV-2 IGG SERPL QL IA: NEGATIVE — SIGNIFICANT CHANGE UP
SARS-COV-2 IGM SERPL IA-ACNC: 0.09 INDEX — SIGNIFICANT CHANGE UP
SODIUM SERPL-SCNC: 143 MMOL/L — SIGNIFICANT CHANGE UP (ref 135–145)
WBC # BLD: 7.35 K/UL — SIGNIFICANT CHANGE UP (ref 3.8–10.5)
WBC # FLD AUTO: 7.35 K/UL — SIGNIFICANT CHANGE UP (ref 3.8–10.5)

## 2020-11-01 PROCEDURE — 99223 1ST HOSP IP/OBS HIGH 75: CPT

## 2020-11-01 RX ORDER — ASPIRIN/CALCIUM CARB/MAGNESIUM 324 MG
81 TABLET ORAL DAILY
Refills: 0 | Status: DISCONTINUED | OUTPATIENT
Start: 2020-11-01 | End: 2020-11-03

## 2020-11-01 RX ORDER — METOPROLOL TARTRATE 50 MG
25 TABLET ORAL
Refills: 0 | Status: DISCONTINUED | OUTPATIENT
Start: 2020-11-01 | End: 2020-11-03

## 2020-11-01 RX ORDER — HEPARIN SODIUM 5000 [USP'U]/ML
5000 INJECTION INTRAVENOUS; SUBCUTANEOUS EVERY 12 HOURS
Refills: 0 | Status: DISCONTINUED | OUTPATIENT
Start: 2020-11-01 | End: 2020-11-03

## 2020-11-01 RX ADMIN — Medication 25 MILLIGRAM(S): at 17:29

## 2020-11-01 RX ADMIN — AMLODIPINE BESYLATE 5 MILLIGRAM(S): 2.5 TABLET ORAL at 06:22

## 2020-11-01 RX ADMIN — Medication 81 MILLIGRAM(S): at 11:31

## 2020-11-01 RX ADMIN — FAMOTIDINE 20 MILLIGRAM(S): 10 INJECTION INTRAVENOUS at 11:31

## 2020-11-01 RX ADMIN — SODIUM CHLORIDE 3 MILLILITER(S): 9 INJECTION INTRAMUSCULAR; INTRAVENOUS; SUBCUTANEOUS at 13:39

## 2020-11-01 RX ADMIN — SODIUM CHLORIDE 3 MILLILITER(S): 9 INJECTION INTRAMUSCULAR; INTRAVENOUS; SUBCUTANEOUS at 21:05

## 2020-11-01 RX ADMIN — SIMVASTATIN 10 MILLIGRAM(S): 20 TABLET, FILM COATED ORAL at 21:06

## 2020-11-01 RX ADMIN — HEPARIN SODIUM 5000 UNIT(S): 5000 INJECTION INTRAVENOUS; SUBCUTANEOUS at 17:29

## 2020-11-01 RX ADMIN — SODIUM CHLORIDE 3 MILLILITER(S): 9 INJECTION INTRAMUSCULAR; INTRAVENOUS; SUBCUTANEOUS at 05:51

## 2020-11-01 NOTE — CONSULT NOTE ADULT - ATTENDING COMMENTS
I counseled the patient about his likely diagnosis, and the further testing and medication indicated for evaluation and management of vertigo.

## 2020-11-01 NOTE — CONSULT NOTE ADULT - SUBJECTIVE AND OBJECTIVE BOX
NEUROLOGY CONSULT NOTE    NAME:  ANTONI REBOLLEDO      ASSESSMENT:  75 RHM with benign paroxysmal positional vertigo, less likely posterior circulation stroke      RECOMMENDATIONS:    - Continue meclizine 12.5mg as needed for vertigo - can decrease PRN frequency from Q6H to Q8H as symptoms improve    - PT/OT for vestibular therapy    - MRI Brain & IAC w/wo Gadolinium approved to evaluate for stroke or IAC pathology    - Routine follow-up with Otolaryngology or Neurology          *******************************      CHIEF COMPLAINT:  Patient is a 75y old  Male who presents with a chief complaint of Vertigo (01 Nov 2020 10:28)      HPI:  74 yo M, from home, self ambulatory with PMH OF   HTN, HLD, prostate CA ( s/p Radz,  last radiation in 2016) p/w dizziness x 2 days. Pt was in his usual state of health until2 days ago and then developed sudden onset of Vertigo when he woke up from sleep. He Describes his vertigo be worsened  with movement since this morning. He also endorses Nausea  associated with  3  episodes of  NBNB emesis over the day. Pt states that he's had numerous similar episodes to this in the past where he has vertigo with nausea that responds spontaneously and denies h/o CVA. Pt usually walks without any assistance but has limited his activity due to the vertigo. Currently pt states that he has no vertigo or other symptoms, even with movement during the exam, and that it resolved in the ambulance with no interventions given. Pt had a similar presentation 5 years ago and was hospitalized.  Pt denies decreased PO intake, ear pain, tinnitus,  or auditory symptoms, diarrhea, urinary symptoms, headache, neck stiffness, fever or infectious symptoms, syncope, chest pain, SOB, focal numbness.   Pt denies any smoking, alcohol or any form of substance abuse.  In  the ED,   Pt is AAOX3, no signs of any distress  Vitals- 132/76, Hr 92, afebrile  K+ 3.2, replaced with 40 meq KCL   Cr 1.3  CT head - no acute changes  EKG- t wave inversions- v4-v6, III and avf  Trops x1 negative  (31 Oct 2020 03:26)    NEURO HPI:  75 RHM who reports having a previous diagnosis of vertigo, has been experiencing recurrence of room-spinning sensation, especially when lying down or when trying to get up from a lying position, although not while sitting, standing, or walking. Since admission and since receiving PRN meclizine, his symptoms have improved. He denies any other neurological symptoms.    PAST MEDICAL & SURGICAL HISTORY:  Prostate cancer  S/p radiotherapy in Nov 2015  HTN (hypertension)  HLD (hyperlipidemia)    MEDICATIONS:  aspirin enteric coated 81 milliGRAM(s) Oral daily  famotidine    Tablet 20 milliGRAM(s) Oral daily  heparin   Injectable 5000 Unit(s) SubCutaneous every 12 hours  meclizine 12.5 milliGRAM(s) Oral every 6 hours PRN  metoprolol tartrate 25 milliGRAM(s) Oral two times a day  ondansetron Injectable 4 milliGRAM(s) IV Push every 4 hours PRN  simvastatin 10 milliGRAM(s) Oral at bedtime  sodium chloride 0.9% lock flush 3 milliLiter(s) IV Push every 8 hours  sodium chloride 0.9%. 1000 milliLiter(s) IV Continuous <Continuous>    ALLERGIES:  No Known Allergies    FAMILY HISTORY:  Denies family history of vertigo    SOCIAL HISTORY:  Denies alcohol, tobacco, and illicit drug use    REVIEW OF SYSTEMS:  GENERAL: No fever, weight changes, fatigue  EYES: No eye pain or discharge  EAR/NOSE/MOUTH/THROAT: No sinus or throat pain; No difficulty hearing  NECK: No pain or stiffness  RESPIRATORY: No cough, wheezing, chills, or hemoptysis  CARDIOVASCULAR: No chest pain, palpitations, shortness of breath, or dyspnea on exertion  GASTROINTESTINAL: No abdominal pain, nausea, vomiting, hematemesis, diarrhea, or constipation  GENITOURINARY: No dysuria, frequency, hematuria, or incontinence  SKIN: No rashes or lesions  ENDOCRINE: No heat or cold intolerance  HEMATOLOGIC: No easy bruising or bleeding  PSYCHIATRIC: No depression, anxiety, or mood swings  MUSCULOSKELETAL: No joint pain or swelling  NEUROLOGICAL: As per HPI      OBJECTIVE:    Vital Signs Last 24 Hrs  T(C): 37.3 (01 Nov 2020 19:52), Max: 37.3 (01 Nov 2020 19:52)  T(F): 99.1 (01 Nov 2020 19:52), Max: 99.1 (01 Nov 2020 19:52)  HR: 61 (01 Nov 2020 19:52) (61 - 87)  BP: 143/72 (01 Nov 2020 19:52) (138/73 - 152/75)  RR: 18 (01 Nov 2020 19:52) (18 - 18)  SpO2: 97% (01 Nov 2020 19:52) (97% - 100%)    General Examination:  General: No acute distress  HEENT: Atraumatic, Normocephalic  Respiratory: CTA B/l.  No crackles, rhonchi, or wheezes.  Cardiovascular: RRR.  Normal S1 & S2.  Normal b/l radial and pedal pulses.    Neurological Examination:  General / Mental Status: AAO x 3.  No aphasia or dysarthria.  Naming and repetition intact.  Cranial Nerves: VFF x 4.  PERRL.  EOMI x 2, No nystagmus or diplopia.  B/l V1-V3 equal and intact to light touch and pinprick.  Symmetric facial movement and palate elevation.  B/l hearing equal to finger rub.  Negative [place]-Hallpike maneuver b/l.  5/5 strength with b/l sternocleidomastoid & trapezius.  Midline tongue protrusion, with no atrophy or fasciculations.  Motor: Normal bulk & tone in all four extremities.  5/5 strength throughout all four extremities.  No downward drift, rigidity, spasticity, or tremors in any of the four extremities.  Sensory: Intact to light touch and pinprick in all four extremities.  Negative Romberg.  Reflex: 2+ and symmetric at b/l biceps, triceps, brachioradialis, patellae, and ankles.  Downgoing toes b/l.  Coordination: No dysmetria with b/l finger-to-nose and heel raise tests.  Symmetric rapid alternating movements b/l.  Gait: Normal, narrow-based gait.  No difficulty with tiptoe, heel, and tandem gaits.    NIHSS 0  MRS 1        LABORATORY VALUES:                        12.3   7.35  )-----------( 234      ( 01 Nov 2020 06:43 )             38.0       11-01    143  |  107  |  13  ----------------------------<  81  3.8   |  30  |  0.96    Ca    8.8      01 Nov 2020 06:43  Phos  2.3     11-01  Mg     2.4     11-01    TPro  6.6  /  Alb  3.1<L>  /  TBili  0.6  /  DBili  x   /  AST  27  /  ALT  32  /  AlkPhos  62  11-01    10-31 Chol 139 LDL -- HDL 63 Trig 49        NEUROIMAGING:    CT Head (10/31/20):  - No acute intracranial abnormality  - Chronic microvascular disease  - Mild diffuse atrophy          Please contact the Neurology consult service with any questions.    Amadou Rebolledo MD   of Neurology  [facility] [employer] [facility]

## 2020-11-01 NOTE — PROGRESS NOTE ADULT - SUBJECTIVE AND OBJECTIVE BOX
CHIEF COMPLAINT:Patient is a 75y old  Male who presents with a chief complaint of Vertigo.Pt appears comfortable.    	  REVIEW OF SYSTEMS:  CONSTITUTIONAL: No fever, weight loss, or fatigue  EYES: No eye pain, visual disturbances, or discharge  ENT:  No difficulty hearing, tinnitus, vertigo; No sinus or throat pain  NECK: No pain or stiffness  RESPIRATORY: No cough, wheezing, chills or hemoptysis; No Shortness of Breath  CARDIOVASCULAR: No chest pain, palpitations, passing out, dizziness, or leg swelling  GASTROINTESTINAL: No abdominal or epigastric pain. No nausea, vomiting, or hematemesis; No diarrhea or constipation. No melena or hematochezia.  GENITOURINARY: No dysuria, frequency, hematuria, or incontinence  NEUROLOGICAL: No headaches, memory loss, loss of strength, numbness, or tremors  SKIN: No itching, burning, rashes, or lesions   LYMPH Nodes: No enlarged glands  ENDOCRINE: No heat or cold intolerance; No hair loss  MUSCULOSKELETAL: No joint pain or swelling; No muscle, back, or extremity pain  PSYCHIATRIC: No depression, anxiety, mood swings, or difficulty sleeping  HEME/LYMPH: No easy bruising, or bleeding gums  ALLERGY AND IMMUNOLOGIC: No hives or eczema	      PHYSICAL EXAM:  T(C): 36.6 (11-01-20 @ 07:59), Max: 37.1 (10-31-20 @ 11:05)  HR: 69 (11-01-20 @ 04:13) (69 - 94)  BP: 152/75 (11-01-20 @ 04:13) (136/71 - 152/88)  RR: 18 (11-01-20 @ 07:59) (18 - 18)  SpO2: 100% (11-01-20 @ 07:59) (99% - 100%)  Wt(kg): --  I&O's Summary    31 Oct 2020 08:01  -  01 Nov 2020 07:00  --------------------------------------------------------  IN: 0 mL / OUT: 100 mL / NET: -100 mL        Appearance: Normal	  HEENT:   Normal oral mucosa, PERRL, EOMI	  Lymphatic: No lymphadenopathy  Cardiovascular: Normal S1 S2, No JVD, No murmurs, No edema  Respiratory: Lungs clear to auscultation	  Psychiatry: A & O x 3, Mood & affect appropriate  Gastrointestinal:  Soft, Non-tender, + BS	  Skin: No rashes, No ecchymoses, No cyanosis	  Neurologic: Non-focal  Extremities: Normal range of motion, No clubbing, cyanosis or edema  Vascular: Peripheral pulses palpable 2+ bilaterally    MEDICATIONS  (STANDING):  aspirin enteric coated 81 milliGRAM(s) Oral daily  famotidine    Tablet 20 milliGRAM(s) Oral daily  heparin   Injectable 5000 Unit(s) SubCutaneous every 12 hours  influenza   Vaccine 0.5 milliLiter(s) IntraMuscular once  metoprolol tartrate 25 milliGRAM(s) Oral two times a day  simvastatin 10 milliGRAM(s) Oral at bedtime  sodium chloride 0.9% lock flush 3 milliLiter(s) IV Push every 8 hours  sodium chloride 0.9%. 1000 milliLiter(s) (60 mL/Hr) IV Continuous <Continuous>      TELEMETRY: 	nsr    	  	  LABS:	 	  CARDIAC MARKERS ( 31 Oct 2020 05:44 )  <0.015 ng/mL / x     / x     / x     / x      CARDIAC MARKERS ( 30 Oct 2020 23:09 )  <0.015 ng/mL / x     / x     / x     / x                                    12.3   7.35  )-----------( 234      ( 01 Nov 2020 06:43 )             38.0     11-01    143  |  107  |  13  ----------------------------<  81  3.8   |  30  |  0.96    Ca    8.8      01 Nov 2020 06:43  Phos  2.3     11-01  Mg     2.4     11-01    TPro  6.6  /  Alb  3.1<L>  /  TBili  0.6  /  DBili  x   /  AST  27  /  ALT  32  /  AlkPhos  62  11-01    proBNP: Serum Pro-Brain Natriuretic Peptide: 40 pg/mL (10-30 @ 23:09)    Lipid Profile: Cholesterol 139  LDL --  HDL 63  TG 49      TSH: Thyroid Stimulating Hormone, Serum: 0.85 uU/mL (10-31 @ 05:44)      	    Transthoracic Echocardiogram   OBSERVATIONS:  Mitral Valve: Normal mitral valve. Moderate mitral  regurgitation.  Aortic Root: Aortic Root: 3.5 cm.  Aortic Valve: Normal trileaflet aortic valve.  Left Atrium:LA volume index = 14 cc/m2.  Left Ventricle: Normal Left Ventricular Systolic Function,  (EF = 55 to 60%) Normal left ventricular internal  dimensions and wall thicknesses. Normal diastolic function.  Right Heart: Normal right atrium. Normal right ventricular  size and systolic function (TAPSE  2.2cm). Normal tricuspid  valve. There is mild pulmonic regurgitation.  Pericardium/PleuraNormal pericardium with no pericardial  effusion.  Hemodynamic: RV systolic pressure is normal at  33 mm Hg.

## 2020-11-01 NOTE — PROGRESS NOTE ADULT - SUBJECTIVE AND OBJECTIVE BOX
Patient is a 75y old  Male who presents with a chief complaint of Vertigo (31 Oct 2020 17:55)    pt seen in icu [  ], reg med floor [   ], bed [  ], chair at bedside [   ], a+o x3 [  ], lethargic [  ],  nad [  ]    roman [  ], ngt [  ], peg [  ], et tube [  ], cent line [  ], picc line [  ]        Allergies    No Known Allergies        Vitals    T(F): 98.2 (11-01-20 @ 04:13), Max: 98.7 (10-31-20 @ 11:05)  HR: 69 (11-01-20 @ 04:13) (69 - 94)  BP: 152/75 (11-01-20 @ 04:13) (136/71 - 152/88)  RR: 18 (11-01-20 @ 04:13) (18 - 18)  SpO2: 99% (11-01-20 @ 04:13) (99% - 100%)  Wt(kg): --  CAPILLARY BLOOD GLUCOSE          Labs                          13.2   9.82  )-----------( 248      ( 31 Oct 2020 05:44 )             39.5       10-31    141  |  104  |  18  ----------------------------<  98  3.4<L>   |  30  |  1.07    Ca    9.0      31 Oct 2020 05:44  Phos  1.9     10-31  Mg     2.1     10-31    TPro  7.4  /  Alb  3.5  /  TBili  0.5  /  DBili  x   /  AST  18  /  ALT  36  /  AlkPhos  69  10-31      CARDIAC MARKERS ( 31 Oct 2020 05:44 )  <0.015 ng/mL / x     / x     / x     / x      CARDIAC MARKERS ( 30 Oct 2020 23:09 )  <0.015 ng/mL / x     / x     / x     / x                Radiology Results      Meds    MEDICATIONS  (STANDING):  amLODIPine   Tablet 5 milliGRAM(s) Oral daily  famotidine    Tablet 20 milliGRAM(s) Oral daily  heparin   Injectable 5000 Unit(s) IV Push once  influenza   Vaccine 0.5 milliLiter(s) IntraMuscular once  simvastatin 10 milliGRAM(s) Oral at bedtime  sodium chloride 0.9% lock flush 3 milliLiter(s) IV Push every 8 hours  sodium chloride 0.9%. 1000 milliLiter(s) (60 mL/Hr) IV Continuous <Continuous>      MEDICATIONS  (PRN):  meclizine 12.5 milliGRAM(s) Oral every 6 hours PRN vertigo  ondansetron Injectable 4 milliGRAM(s) IV Push every 4 hours PRN Nausea and/or Vomiting      Physical Exam    Neuro :  no focal deficits  Respiratory: CTA B/L  CV: RRR, S1S2, no murmurs,   Abdominal: Soft, NT, ND +BS,  Extremities: No edema, + peripheral pulses    ASSESSMENT    Dizziness and giddiness    Prostate cancer    HTN (hypertension)    HLD (hyperlipidemia)        PLAN     Patient is a 75y old  Male who presents with a chief complaint of Vertigo (31 Oct 2020 17:55)    pt seen in tele [x  ], reg med floor [   ], bed [ x ], chair at bedside [   ], a+o x3 [ x, lethargic [  ],  nad [x  ]        Allergies    No Known Allergies        Vitals    T(F): 98.2 (11-01-20 @ 04:13), Max: 98.7 (10-31-20 @ 11:05)  HR: 69 (11-01-20 @ 04:13) (69 - 94)  BP: 152/75 (11-01-20 @ 04:13) (136/71 - 152/88)  RR: 18 (11-01-20 @ 04:13) (18 - 18)  SpO2: 99% (11-01-20 @ 04:13) (99% - 100%)  Wt(kg): --  CAPILLARY BLOOD GLUCOSE          Labs                          13.2   9.82  )-----------( 248      ( 31 Oct 2020 05:44 )             39.5       10-31    141  |  104  |  18  ----------------------------<  98  3.4<L>   |  30  |  1.07    Ca    9.0      31 Oct 2020 05:44  Phos  1.9     10-31  Mg     2.1     10-31    TPro  7.4  /  Alb  3.5  /  TBili  0.5  /  DBili  x   /  AST  18  /  ALT  36  /  AlkPhos  69  10-31      CARDIAC MARKERS ( 31 Oct 2020 05:44 )  <0.015 ng/mL / x     / x     / x     / x      CARDIAC MARKERS ( 30 Oct 2020 23:09 )  <0.015 ng/mL / x     / x     / x     / x                Radiology Results    < from: US Duplex Carotid Arteries Complete, Bilateral (10.31.20 @ 11:09) >  IMPRESSION:    No evidence of hemodynamically significant internal carotid artery stenosis.    Atherosclerosis of the carotid arteries is present.    < end of copied text >    < from: CT Head No Cont (10.31.20 @ 01:11) >  Impression: No CT evidence of acute intracranial abnormality.    < end of copied text >      Meds    MEDICATIONS  (STANDING):  amLODIPine   Tablet 5 milliGRAM(s) Oral daily  famotidine    Tablet 20 milliGRAM(s) Oral daily  heparin   Injectable 5000 Unit(s) IV Push once  influenza   Vaccine 0.5 milliLiter(s) IntraMuscular once  simvastatin 10 milliGRAM(s) Oral at bedtime  sodium chloride 0.9% lock flush 3 milliLiter(s) IV Push every 8 hours  sodium chloride 0.9%. 1000 milliLiter(s) (60 mL/Hr) IV Continuous <Continuous>      MEDICATIONS  (PRN):  meclizine 12.5 milliGRAM(s) Oral every 6 hours PRN vertigo  ondansetron Injectable 4 milliGRAM(s) IV Push every 4 hours PRN Nausea and/or Vomiting      Physical Exam    Neuro :  no focal deficits  Respiratory: CTA B/L  CV: RRR, S1S2, no murmurs,   Abdominal: Soft, NT, ND +BS,  Extremities: No edema, + peripheral pulses    ASSESSMENT    vertigo,   r/o cns patho,  ecg changes r/o acs,   s/p shakeel,   s/p hypokalemia,   h/o prostate CA ( s/p Radz,  last radiation in 2016)   Prostate cancer  HTN (hypertension)  HLD (hyperlipidemia)        PLAN      cont tele,   cont aspirin, statin,   meclizine prn,   ct head with No CT evidence of acute intracranial abnormality noted above.  neuro cons  carotid duplex with No CT evidence of acute intracranial abnormality noted above.  cardio f/u   ce q8 x 2 neg noted above  f/u orthostatic bp q8hrs  cont norvasc  f/u echo  phys tx eval noted and rec physical tx 1-2x/week While in house  cont current meds

## 2020-11-02 DIAGNOSIS — R94.31 ABNORMAL ELECTROCARDIOGRAM [ECG] [EKG]: ICD-10-CM

## 2020-11-02 LAB
ALBUMIN SERPL ELPH-MCNC: 3.1 G/DL — LOW (ref 3.5–5)
ALP SERPL-CCNC: 63 U/L — SIGNIFICANT CHANGE UP (ref 40–120)
ALT FLD-CCNC: 33 U/L DA — SIGNIFICANT CHANGE UP (ref 10–60)
ANION GAP SERPL CALC-SCNC: 6 MMOL/L — SIGNIFICANT CHANGE UP (ref 5–17)
AST SERPL-CCNC: 28 U/L — SIGNIFICANT CHANGE UP (ref 10–40)
BASOPHILS # BLD AUTO: 0.04 K/UL — SIGNIFICANT CHANGE UP (ref 0–0.2)
BASOPHILS NFR BLD AUTO: 0.6 % — SIGNIFICANT CHANGE UP (ref 0–2)
BILIRUB SERPL-MCNC: 0.7 MG/DL — SIGNIFICANT CHANGE UP (ref 0.2–1.2)
BUN SERPL-MCNC: 17 MG/DL — SIGNIFICANT CHANGE UP (ref 7–18)
CALCIUM SERPL-MCNC: 8.7 MG/DL — SIGNIFICANT CHANGE UP (ref 8.4–10.5)
CHLORIDE SERPL-SCNC: 108 MMOL/L — SIGNIFICANT CHANGE UP (ref 96–108)
CO2 SERPL-SCNC: 29 MMOL/L — SIGNIFICANT CHANGE UP (ref 22–31)
CREAT SERPL-MCNC: 1.05 MG/DL — SIGNIFICANT CHANGE UP (ref 0.5–1.3)
EOSINOPHIL # BLD AUTO: 0.5 K/UL — SIGNIFICANT CHANGE UP (ref 0–0.5)
EOSINOPHIL NFR BLD AUTO: 8.1 % — HIGH (ref 0–6)
GLUCOSE SERPL-MCNC: 82 MG/DL — SIGNIFICANT CHANGE UP (ref 70–99)
HCT VFR BLD CALC: 37 % — LOW (ref 39–50)
HGB BLD-MCNC: 12.2 G/DL — LOW (ref 13–17)
IMM GRANULOCYTES NFR BLD AUTO: 0.2 % — SIGNIFICANT CHANGE UP (ref 0–1.5)
LYMPHOCYTES # BLD AUTO: 1.19 K/UL — SIGNIFICANT CHANGE UP (ref 1–3.3)
LYMPHOCYTES # BLD AUTO: 19.2 % — SIGNIFICANT CHANGE UP (ref 13–44)
MAGNESIUM SERPL-MCNC: 2.2 MG/DL — SIGNIFICANT CHANGE UP (ref 1.6–2.6)
MCHC RBC-ENTMCNC: 28.4 PG — SIGNIFICANT CHANGE UP (ref 27–34)
MCHC RBC-ENTMCNC: 33 GM/DL — SIGNIFICANT CHANGE UP (ref 32–36)
MCV RBC AUTO: 86 FL — SIGNIFICANT CHANGE UP (ref 80–100)
MONOCYTES # BLD AUTO: 0.57 K/UL — SIGNIFICANT CHANGE UP (ref 0–0.9)
MONOCYTES NFR BLD AUTO: 9.2 % — SIGNIFICANT CHANGE UP (ref 2–14)
NEUTROPHILS # BLD AUTO: 3.89 K/UL — SIGNIFICANT CHANGE UP (ref 1.8–7.4)
NEUTROPHILS NFR BLD AUTO: 62.7 % — SIGNIFICANT CHANGE UP (ref 43–77)
NRBC # BLD: 0 /100 WBCS — SIGNIFICANT CHANGE UP (ref 0–0)
PHOSPHATE SERPL-MCNC: 2.5 MG/DL — SIGNIFICANT CHANGE UP (ref 2.5–4.5)
PLATELET # BLD AUTO: 238 K/UL — SIGNIFICANT CHANGE UP (ref 150–400)
POTASSIUM SERPL-MCNC: 3.5 MMOL/L — SIGNIFICANT CHANGE UP (ref 3.5–5.3)
POTASSIUM SERPL-SCNC: 3.5 MMOL/L — SIGNIFICANT CHANGE UP (ref 3.5–5.3)
PROT SERPL-MCNC: 6.5 G/DL — SIGNIFICANT CHANGE UP (ref 6–8.3)
RBC # BLD: 4.3 M/UL — SIGNIFICANT CHANGE UP (ref 4.2–5.8)
RBC # FLD: 14.1 % — SIGNIFICANT CHANGE UP (ref 10.3–14.5)
SODIUM SERPL-SCNC: 143 MMOL/L — SIGNIFICANT CHANGE UP (ref 135–145)
WBC # BLD: 6.2 K/UL — SIGNIFICANT CHANGE UP (ref 3.8–10.5)
WBC # FLD AUTO: 6.2 K/UL — SIGNIFICANT CHANGE UP (ref 3.8–10.5)

## 2020-11-02 PROCEDURE — 70551 MRI BRAIN STEM W/O DYE: CPT | Mod: 26,76

## 2020-11-02 RX ADMIN — Medication 81 MILLIGRAM(S): at 13:06

## 2020-11-02 RX ADMIN — HEPARIN SODIUM 5000 UNIT(S): 5000 INJECTION INTRAVENOUS; SUBCUTANEOUS at 17:57

## 2020-11-02 RX ADMIN — Medication 25 MILLIGRAM(S): at 17:57

## 2020-11-02 RX ADMIN — SODIUM CHLORIDE 3 MILLILITER(S): 9 INJECTION INTRAMUSCULAR; INTRAVENOUS; SUBCUTANEOUS at 13:07

## 2020-11-02 RX ADMIN — SODIUM CHLORIDE 3 MILLILITER(S): 9 INJECTION INTRAMUSCULAR; INTRAVENOUS; SUBCUTANEOUS at 21:13

## 2020-11-02 RX ADMIN — FAMOTIDINE 20 MILLIGRAM(S): 10 INJECTION INTRAVENOUS at 13:06

## 2020-11-02 RX ADMIN — HEPARIN SODIUM 5000 UNIT(S): 5000 INJECTION INTRAVENOUS; SUBCUTANEOUS at 06:09

## 2020-11-02 RX ADMIN — SODIUM CHLORIDE 3 MILLILITER(S): 9 INJECTION INTRAMUSCULAR; INTRAVENOUS; SUBCUTANEOUS at 06:10

## 2020-11-02 RX ADMIN — SIMVASTATIN 10 MILLIGRAM(S): 20 TABLET, FILM COATED ORAL at 21:13

## 2020-11-02 RX ADMIN — Medication 25 MILLIGRAM(S): at 06:10

## 2020-11-02 NOTE — PROGRESS NOTE ADULT - PROBLEM SELECTOR PLAN 3
Pt  on admission has mild Cr elevation 1.3  LINDSEY likely from dehydration   Creat today is 1.05, resolved Pt  on admission has mild Cr elevation 1.3  LINDSEY likely from dehydration   Cr 11/02 is 1.05, resolved

## 2020-11-02 NOTE — PROGRESS NOTE ADULT - PROBLEM SELECTOR PLAN 2
EKG showed sinus rhythm with 1st degree A-V block, ST & T wave abnormality(anterolateral ischemia)  On Tele monitoring  Orthostats negative  f/u Stress test  Cardiology Dr. Baca following

## 2020-11-02 NOTE — PROGRESS NOTE ADULT - SUBJECTIVE AND OBJECTIVE BOX
PGY-1 Progress Note discussed with attending    PAGER #: [phone]] TILL 5:00 PM  PLEASE CONTACT ON CALL TEAM:  - On Call Team (Please refer to [place]) FROM 5:00 PM - 8:30PM  - Nightfloat Team FROM 8:30 -7:30 AM    CHIEF COMPLAINT & BRIEF HOSPITAL COURSE: 74 yo M, from home, self ambulatory with PMH OF   HTN, HLD, prostate CA ( s/p Radz,  last radiation in 2016) p/w dizziness x 2 days. Pt was in his usual state of health until2 days ago and then developed sudden onset of Vertigo when he woke up from sleep. He Describes his vertigo be be worsened  with movement since this morning. He also endorses Nausea  associated with  3  episodes of  NBNB emesis over the day. Pt states that he's had numerous similar episodes to this in the past where he has vertigo with nausea that responds spontaneously and denies h/o CVA. Pt usually walks without any assistance but has limited his activity due to the vertigo. Currently pt states that he has no vertigo or other symptoms, even with movement during the exam, and that it resolved in the ambulance with no interventions given. Pt had a similar presentation 5 years ago and was hospitalized.  Pt denies decreased PO intake, ear pain, tinnitus,  or auditory symptoms, diarrhea, urinary symptoms, headache, neck stiffness, fever or infectious symptoms, syncope, chest pain, SOB, focal numbness. Pt denies any smoking, alcohol or any form of substance abuse.  For the vertigo, EKG showed sinus rhythm with 1st degree A-V block, ST & T wave abnormality(anterolateral ischemia), On Tele monitoring, Orthostats negative, f/u Stress test, Cardiology Dr. Baca following CT head showed no acute changes, US doppler carotid negative, f/u MRI head and MR IAC, Neuro consult- Dr. Izquierdo    INTERVAL HPI/OVERNIGHT EVENTS: No adverse events overnight    REVIEW OF SYSTEMS:  CONSTITUTIONAL: No fever, weight loss, or fatigue  RESPIRATORY: No cough, wheezing, chills or hemoptysis; No shortness of breath  CARDIOVASCULAR: No chest pain, palpitations, dizziness, or leg swelling  GASTROINTESTINAL: No abdominal pain. No nausea, vomiting, or hematemesis; No diarrhea or constipation. No melena or hematochezia.  GENITOURINARY: No dysuria or hematuria, urinary frequency  NEUROLOGICAL: No headaches, memory loss, loss of strength, numbness, or tremors  SKIN: No itching, burning, rashes, or lesions     Vital Signs Last 24 Hrs  T(C): 36.6 (02 Nov 2020 11:09), Max: 37.3 (01 Nov 2020 19:52)  T(F): 97.8 (02 Nov 2020 11:09), Max: 99.1 (01 Nov 2020 19:52)  HR: 71 (02 Nov 2020 11:09) (55 - 78)  BP: 137/83 (02 Nov 2020 11:09) (137/83 - 151/77)  BP(mean): --  RR: 18 (02 Nov 2020 11:09) (18 - 18)  SpO2: 100% (02 Nov 2020 11:09) (97% - 100%)    PHYSICAL EXAMINATION:  GENERAL: NAD, well built  HEAD:  Atraumatic, Normocephalic  EYES:  conjunctiva and sclera clear  NECK: Supple, No JVD, Normal thyroid  CHEST/LUNG: Clear to auscultation. Clear to percussion bilaterally; No rales, rhonchi, wheezing, or rubs  HEART: Regular rate and rhythm; No murmurs, rubs, or gallops  ABDOMEN: Soft, Nontender, Nondistended; Bowel sounds present  NERVOUS SYSTEM:  Alert & Oriented X3,    EXTREMITIES:  2+ Peripheral Pulses, No clubbing, cyanosis, or edema  SKIN: warm dry                          12.2   6.20  )-----------( 238      ( 02 Nov 2020 06:31 )             37.0     11-02    143  |  108  |  17  ----------------------------<  82  3.5   |  29  |  1.05    Ca    8.7      02 Nov 2020 06:31  Phos  2.5     11-02  Mg     2.2     11-02    TPro  6.5  /  Alb  3.1<L>  /  TBili  0.7  /  DBili  x   /  AST  28  /  ALT  33  /  AlkPhos  63  11-02    LIVER FUNCTIONS - ( 02 Nov 2020 06:31 )  Alb: 3.1 g/dL / Pro: 6.5 g/dL / ALK PHOS: 63 U/L / ALT: 33 U/L DA / AST: 28 U/L / GGT: x           CAPILLARY BLOOD GLUCOSE    RADIOLOGY & ADDITIONAL TESTS:

## 2020-11-02 NOTE — PROGRESS NOTE ADULT - PROBLEM SELECTOR PLAN 1
Pt admitted for Vertigo worsening with positional changes  CT head- no acute changes   US doppler carotid negative  f/u MRI head and MR IAC  Neuro consult- Dr. Izquierdo

## 2020-11-02 NOTE — PROGRESS NOTE ADULT - PROBLEM SELECTOR PLAN 5
Pt has PMH of HLD  Home meds- simvastatin 10mg   Lipid profile is nml  c/w home meds Pt has PMH of HLD  Home meds- simvastatin 10mg   Lipid profile is WNL  c/w home meds

## 2020-11-02 NOTE — PROGRESS NOTE ADULT - ASSESSMENT
74 yo M, from home, self ambulatory with PMH OF   HTN, HLD, prostate CA ( s/p Radz,  last radiation in 2016) p/w dizziness x 2 days. Pt was in his usual state of health until2 days ago and then developed sudden onset of Vertigo when he woke up from sleep. Admitted for vertigo workup.

## 2020-11-02 NOTE — PROGRESS NOTE ADULT - ASSESSMENT
74 yo M, from home, self ambulatory with PMH OF   HTN, HLD, prostate CA ( s/p Radz,  last radiation in 2016) p/w dizziness x 2 days. Pt was in his usual state of health until2 days ago and then developed sudden onset of Vertigo when he woke up from sleep,abnormal EKG,s/p LINDSEY.  1.Tele monitoring.  2.Orthostatic bp -  3.Stress test -r/o ischemia.  4.HTN-b blocker.  5.Lipid d/o-statin.  6.Neurology eval noted, await MRI.  7.GI and DVT prophylaxis.

## 2020-11-02 NOTE — PROGRESS NOTE ADULT - SUBJECTIVE AND OBJECTIVE BOX
CHIEF COMPLAINT:Patient is a 75y old  Male who presents with a chief complaint of Vertigo.Pt appears comfortable.    	  REVIEW OF SYSTEMS:  CONSTITUTIONAL: No fever, weight loss, or fatigue  EYES: No eye pain, visual disturbances, or discharge  ENT:  No difficulty hearing, tinnitus, vertigo; No sinus or throat pain  NECK: No pain or stiffness  RESPIRATORY: No cough, wheezing, chills or hemoptysis; No Shortness of Breath  CARDIOVASCULAR: No chest pain, palpitations, passing out, dizziness, or leg swelling  GASTROINTESTINAL: No abdominal or epigastric pain. No nausea, vomiting, or hematemesis; No diarrhea or constipation. No melena or hematochezia.  GENITOURINARY: No dysuria, frequency, hematuria, or incontinence  NEUROLOGICAL: No headaches, memory loss, loss of strength, numbness, or tremors  SKIN: No itching, burning, rashes, or lesions   LYMPH Nodes: No enlarged glands  ENDOCRINE: No heat or cold intolerance; No hair loss  MUSCULOSKELETAL: No joint pain or swelling; No muscle, back, or extremity pain  PSYCHIATRIC: No depression, anxiety, mood swings, or difficulty sleeping  HEME/LYMPH: No easy bruising, or bleeding gums  ALLERGY AND IMMUNOLOGIC: No hives or eczema	    PHYSICAL EXAM:  T(C): 36.6 (11-02-20 @ 07:26), Max: 37.3 (11-01-20 @ 19:52)  HR: 55 (11-02-20 @ 07:26) (55 - 87)  BP: 151/77 (11-02-20 @ 07:26) (138/73 - 151/77)  RR: 18 (11-02-20 @ 07:26) (18 - 18)  SpO2: 100% (11-02-20 @ 07:26) (97% - 100%)      Appearance: Normal	  HEENT:   Normal oral mucosa, PERRL, EOMI	  Lymphatic: No lymphadenopathy  Cardiovascular: Normal S1 S2, No JVD, No murmurs, No edema  Respiratory: Lungs clear to auscultation	  Psychiatry: A & O x 3, Mood & affect appropriate  Gastrointestinal:  Soft, Non-tender, + BS	  Skin: No rashes, No ecchymoses, No cyanosis	  Neurologic: Non-focal  Extremities: Normal range of motion, No clubbing, cyanosis or edema  Vascular: Peripheral pulses palpable 2+ bilaterally    MEDICATIONS  (STANDING):  aspirin enteric coated 81 milliGRAM(s) Oral daily  famotidine    Tablet 20 milliGRAM(s) Oral daily  heparin   Injectable 5000 Unit(s) SubCutaneous every 12 hours  metoprolol tartrate 25 milliGRAM(s) Oral two times a day  simvastatin 10 milliGRAM(s) Oral at bedtime  sodium chloride 0.9% lock flush 3 milliLiter(s) IV Push every 8 hours  sodium chloride 0.9%. 1000 milliLiter(s) (60 mL/Hr) IV Continuous <Continuous>      	  	  LABS:	 	                       12.2   6.20  )-----------( 238      ( 02 Nov 2020 06:31 )             37.0     11-02    143  |  108  |  17  ----------------------------<  82  3.5   |  29  |  1.05    Ca    8.7      02 Nov 2020 06:31  Phos  2.5     11-02  Mg     2.2     11-02    TPro  6.5  /  Alb  3.1<L>  /  TBili  0.7  /  DBili  x   /  AST  28  /  ALT  33  /  AlkPhos  63  11-02    proBNP: Serum Pro-Brain Natriuretic Peptide: 40 pg/mL (10-30 @ 23:09)    Lipid Profile: Cholesterol 139  LDL --  HDL 63  TG 49      TSH: Thyroid Stimulating Hormone, Serum: 0.85 uU/mL (10-31 @ 05:44)

## 2020-11-02 NOTE — PROGRESS NOTE ADULT - SUBJECTIVE AND OBJECTIVE BOX
Patient is a 75y old  Male who presents with a chief complaint of Vertigo (01 Nov 2020 17:12)    pt seen in tele [x  ], reg med floor [   ], bed [ x ], chair at bedside [   ], a+o x3 [ x, lethargic [  ],    nad [x  ]        Allergies    No Known Allergies        Vitals    T(F): 97.8 (11-02-20 @ 04:17), Max: 99.1 (11-01-20 @ 19:52)  HR: 61 (11-02-20 @ 04:17) (61 - 87)  BP: 141/78 (11-02-20 @ 04:17) (138/73 - 143/72)  RR: 18 (11-02-20 @ 04:17) (18 - 18)  SpO2: 98% (11-02-20 @ 04:17) (97% - 100%)  Wt(kg): --  CAPILLARY BLOOD GLUCOSE          Labs                          12.3   7.35  )-----------( 234      ( 01 Nov 2020 06:43 )             38.0       11-01    143  |  107  |  13  ----------------------------<  81  3.8   |  30  |  0.96    Ca    8.8      01 Nov 2020 06:43  Phos  2.3     11-01  Mg     2.4     11-01    TPro  6.6  /  Alb  3.1<L>  /  TBili  0.6  /  DBili  x   /  AST  27  /  ALT  32  /  AlkPhos  62  11-01                Radiology Results      Meds    MEDICATIONS  (STANDING):  aspirin enteric coated 81 milliGRAM(s) Oral daily  famotidine    Tablet 20 milliGRAM(s) Oral daily  heparin   Injectable 5000 Unit(s) SubCutaneous every 12 hours  metoprolol tartrate 25 milliGRAM(s) Oral two times a day  simvastatin 10 milliGRAM(s) Oral at bedtime  sodium chloride 0.9% lock flush 3 milliLiter(s) IV Push every 8 hours  sodium chloride 0.9%. 1000 milliLiter(s) (60 mL/Hr) IV Continuous <Continuous>      MEDICATIONS  (PRN):  meclizine 12.5 milliGRAM(s) Oral every 6 hours PRN vertigo  ondansetron Injectable 4 milliGRAM(s) IV Push every 4 hours PRN Nausea and/or Vomiting      Physical Exam    Neuro :  no focal deficits  Respiratory: CTA B/L  CV: RRR, S1S2, no murmurs,   Abdominal: Soft, NT, ND +BS,  Extremities: No edema, + peripheral pulses    ASSESSMENT    vertigo,   r/o cns patho,  ecg changes r/o acs,   s/p shakeel,   s/p hypokalemia,   h/o prostate CA ( s/p Radz,  last radiation in 2016)   Prostate cancer  HTN (hypertension)  HLD (hyperlipidemia)        PLAN      cont tele,   cont aspirin, statin,   meclizine prn,   ct head with No CT evidence of acute intracranial abnormality noted above.  neuro cons  carotid duplex with No CT evidence of acute intracranial abnormality noted above.  cardio f/u   ce q8 x 2 neg noted above  f/u orthostatic bp q8hrs  cont norvasc  f/u echo  phys tx eval noted and rec physical tx 1-2x/week While in house  cont current meds           Patient is a 75y old  Male who presents with a chief complaint of Vertigo (01 Nov 2020 17:12)    pt seen in tele [x  ], reg med floor [   ], bed [ x ], chair at bedside [   ], a+o x3 [ x, lethargic [  ],    nad [x  ]        Allergies    No Known Allergies        Vitals    T(F): 97.8 (11-02-20 @ 04:17), Max: 99.1 (11-01-20 @ 19:52)  HR: 61 (11-02-20 @ 04:17) (61 - 87)  BP: 141/78 (11-02-20 @ 04:17) (138/73 - 143/72)  RR: 18 (11-02-20 @ 04:17) (18 - 18)  SpO2: 98% (11-02-20 @ 04:17) (97% - 100%)  Wt(kg): --  CAPILLARY BLOOD GLUCOSE          Labs                          12.3   7.35  )-----------( 234      ( 01 Nov 2020 06:43 )             38.0       11-01    143  |  107  |  13  ----------------------------<  81  3.8   |  30  |  0.96    Ca    8.8      01 Nov 2020 06:43  Phos  2.3     11-01  Mg     2.4     11-01    TPro  6.6  /  Alb  3.1<L>  /  TBili  0.6  /  DBili  x   /  AST  27  /  ALT  32  /  AlkPhos  62  11-01      < from: Transthoracic Echocardiogram (10.31.20 @ 06:48) >  CONCLUSIONS:  1. Normal mitral valve. Moderate mitral regurgitation.  2. Normal trileaflet aortic valve.  3. Aortic Root: 3.5 cm.    4. LA volume index = 14 cc/m2.  5. Normal left ventricular internal dimensions and wall  thicknesses.  6. Normal Left Ventricular Systolic Function,  (EF = 55 to  60%)  7. Normal diastolic function.  8. Normal right atrium.  9. Normal right ventricular size and systolic function  (TAPSE  2.2cm).  10. RV systolic pressure is normal at  33 mm Hg.  11. Normal tricuspid valve.  12. There is mild pulmonic regurgitation.  13. Normal pericardium with no pericardial effusion.    < end of copied text >            Radiology Results      Meds    MEDICATIONS  (STANDING):  aspirin enteric coated 81 milliGRAM(s) Oral daily  famotidine    Tablet 20 milliGRAM(s) Oral daily  heparin   Injectable 5000 Unit(s) SubCutaneous every 12 hours  metoprolol tartrate 25 milliGRAM(s) Oral two times a day  simvastatin 10 milliGRAM(s) Oral at bedtime  sodium chloride 0.9% lock flush 3 milliLiter(s) IV Push every 8 hours  sodium chloride 0.9%. 1000 milliLiter(s) (60 mL/Hr) IV Continuous <Continuous>      MEDICATIONS  (PRN):  meclizine 12.5 milliGRAM(s) Oral every 6 hours PRN vertigo  ondansetron Injectable 4 milliGRAM(s) IV Push every 4 hours PRN Nausea and/or Vomiting      Physical Exam    Neuro :  no focal deficits  Respiratory: CTA B/L  CV: RRR, S1S2, no murmurs,   Abdominal: Soft, NT, ND +BS,  Extremities: No edema, + peripheral pulses    ASSESSMENT    vertigo,   r/o cns patho,  ecg changes r/o acs,   s/p shakeel,   s/p hypokalemia,   h/o prostate CA ( s/p Radz,  last radiation in 2016)   Prostate cancer  HTN (hypertension)  HLD (hyperlipidemia)        PLAN      cont tele,   cont aspirin, statin,   ct head with No CT evidence of acute intracranial abnormality noted.  carotid duplex with No CT evidence of acute intracranial abnormality noted.   neuro f/u   Continue meclizine 12.5mg as needed for vertigo - can decrease PRN frequency from Q6H to Q8H as symptoms improve  PT/OT for vestibular therapy  f/u MRI Brain & IAC w/wo Gadolinium approved to evaluate for stroke or IAC pathology  Routine follow-up with Otolaryngology or Neurology  cardio f/u   ce q8 x 2 neg noted above  orthostatic bp neg this am  Norvasc d/c'd,   cont lopressor 25mg bid  echo with  Normal Left Ventricular Systolic Function,  (EF = 55 to 60%) noted above  phys tx eval noted and rec physical tx 1-2x/week While in house  cont current meds

## 2020-11-03 ENCOUNTER — TRANSCRIPTION ENCOUNTER (OUTPATIENT)
Age: 75
End: 2020-11-03

## 2020-11-03 VITALS
OXYGEN SATURATION: 99 % | TEMPERATURE: 98 F | HEART RATE: 58 BPM | RESPIRATION RATE: 18 BRPM | DIASTOLIC BLOOD PRESSURE: 83 MMHG | SYSTOLIC BLOOD PRESSURE: 151 MMHG

## 2020-11-03 PROCEDURE — A9502: CPT

## 2020-11-03 PROCEDURE — 84300 ASSAY OF URINE SODIUM: CPT

## 2020-11-03 PROCEDURE — 85610 PROTHROMBIN TIME: CPT

## 2020-11-03 PROCEDURE — 84443 ASSAY THYROID STIM HORMONE: CPT

## 2020-11-03 PROCEDURE — 84100 ASSAY OF PHOSPHORUS: CPT

## 2020-11-03 PROCEDURE — 82570 ASSAY OF URINE CREATININE: CPT

## 2020-11-03 PROCEDURE — 78452 HT MUSCLE IMAGE SPECT MULT: CPT

## 2020-11-03 PROCEDURE — 96374 THER/PROPH/DIAG INJ IV PUSH: CPT

## 2020-11-03 PROCEDURE — 93880 EXTRACRANIAL BILAT STUDY: CPT

## 2020-11-03 PROCEDURE — 70450 CT HEAD/BRAIN W/O DYE: CPT

## 2020-11-03 PROCEDURE — 80061 LIPID PANEL: CPT

## 2020-11-03 PROCEDURE — 80053 COMPREHEN METABOLIC PANEL: CPT

## 2020-11-03 PROCEDURE — 93005 ELECTROCARDIOGRAM TRACING: CPT

## 2020-11-03 PROCEDURE — 82436 ASSAY OF URINE CHLORIDE: CPT

## 2020-11-03 PROCEDURE — 83880 ASSAY OF NATRIURETIC PEPTIDE: CPT

## 2020-11-03 PROCEDURE — 93017 CV STRESS TEST TRACING ONLY: CPT

## 2020-11-03 PROCEDURE — 97162 PT EVAL MOD COMPLEX 30 MIN: CPT

## 2020-11-03 PROCEDURE — 87635 SARS-COV-2 COVID-19 AMP PRB: CPT

## 2020-11-03 PROCEDURE — 83735 ASSAY OF MAGNESIUM: CPT

## 2020-11-03 PROCEDURE — 85025 COMPLETE CBC W/AUTO DIFF WBC: CPT

## 2020-11-03 PROCEDURE — 83036 HEMOGLOBIN GLYCOSYLATED A1C: CPT

## 2020-11-03 PROCEDURE — 99285 EMERGENCY DEPT VISIT HI MDM: CPT

## 2020-11-03 PROCEDURE — 84484 ASSAY OF TROPONIN QUANT: CPT

## 2020-11-03 PROCEDURE — 93306 TTE W/DOPPLER COMPLETE: CPT

## 2020-11-03 PROCEDURE — 86769 SARS-COV-2 COVID-19 ANTIBODY: CPT

## 2020-11-03 PROCEDURE — 83935 ASSAY OF URINE OSMOLALITY: CPT

## 2020-11-03 PROCEDURE — 36415 COLL VENOUS BLD VENIPUNCTURE: CPT

## 2020-11-03 PROCEDURE — 70551 MRI BRAIN STEM W/O DYE: CPT

## 2020-11-03 PROCEDURE — 85730 THROMBOPLASTIN TIME PARTIAL: CPT

## 2020-11-03 RX ORDER — AMLODIPINE BESYLATE 2.5 MG/1
1 TABLET ORAL
Qty: 0 | Refills: 0 | DISCHARGE

## 2020-11-03 RX ORDER — LOSARTAN/HYDROCHLOROTHIAZIDE 100MG-25MG
1 TABLET ORAL
Qty: 0 | Refills: 0 | DISCHARGE

## 2020-11-03 RX ORDER — ASPIRIN/CALCIUM CARB/MAGNESIUM 324 MG
1 TABLET ORAL
Qty: 30 | Refills: 0
Start: 2020-11-03 | End: 2020-12-02

## 2020-11-03 RX ORDER — SIMVASTATIN 20 MG/1
1 TABLET, FILM COATED ORAL
Qty: 0 | Refills: 0 | DISCHARGE

## 2020-11-03 RX ORDER — MECLIZINE HCL 12.5 MG
1 TABLET ORAL
Qty: 30 | Refills: 0
Start: 2020-11-03 | End: 2020-12-02

## 2020-11-03 RX ORDER — METOPROLOL TARTRATE 50 MG
1 TABLET ORAL
Qty: 60 | Refills: 0
Start: 2020-11-03 | End: 2020-12-02

## 2020-11-03 RX ADMIN — Medication 25 MILLIGRAM(S): at 05:08

## 2020-11-03 RX ADMIN — FAMOTIDINE 20 MILLIGRAM(S): 10 INJECTION INTRAVENOUS at 11:40

## 2020-11-03 RX ADMIN — SODIUM CHLORIDE 3 MILLILITER(S): 9 INJECTION INTRAMUSCULAR; INTRAVENOUS; SUBCUTANEOUS at 05:09

## 2020-11-03 RX ADMIN — HEPARIN SODIUM 5000 UNIT(S): 5000 INJECTION INTRAVENOUS; SUBCUTANEOUS at 05:08

## 2020-11-03 RX ADMIN — SODIUM CHLORIDE 3 MILLILITER(S): 9 INJECTION INTRAMUSCULAR; INTRAVENOUS; SUBCUTANEOUS at 12:44

## 2020-11-03 RX ADMIN — Medication 81 MILLIGRAM(S): at 11:40

## 2020-11-03 NOTE — PROGRESS NOTE ADULT - SUBJECTIVE AND OBJECTIVE BOX
The patient is satisfied with the status quo  Patient is a 75y old  Male who presents with a chief complaint of Vertigo (02 Nov 2020 14:01)    pt seen in tele [x  ], reg med floor [   ], bed [ x ], chair at bedside [   ], a+o x3 [ x, lethargic [  ],    nad [x  ]      Allergies    No Known Allergies        Vitals    T(F): 98.2 (11-03-20 @ 04:42), Max: 98.2 (11-02-20 @ 23:38)  HR: 56 (11-03-20 @ 04:42) (55 - 71)  BP: 148/81 (11-03-20 @ 04:42) (136/70 - 151/77)  RR: 18 (11-03-20 @ 04:42) (18 - 18)  SpO2: 97% (11-03-20 @ 04:42) (97% - 100%)  Wt(kg): --  CAPILLARY BLOOD GLUCOSE          Labs                          12.2   6.20  )-----------( 238      ( 02 Nov 2020 06:31 )             37.0       11-02    143  |  108  |  17  ----------------------------<  82  3.5   |  29  |  1.05    Ca    8.7      02 Nov 2020 06:31  Phos  2.5     11-02  Mg     2.2     11-02    TPro  6.5  /  Alb  3.1<L>  /  TBili  0.7  /  DBili  x   /  AST  28  /  ALT  33  /  AlkPhos  63  11-02                Radiology Results      Meds    MEDICATIONS  (STANDING):  aspirin enteric coated 81 milliGRAM(s) Oral daily  famotidine    Tablet 20 milliGRAM(s) Oral daily  heparin   Injectable 5000 Unit(s) SubCutaneous every 12 hours  metoprolol tartrate 25 milliGRAM(s) Oral two times a day  simvastatin 10 milliGRAM(s) Oral at bedtime  sodium chloride 0.9% lock flush 3 milliLiter(s) IV Push every 8 hours  sodium chloride 0.9%. 1000 milliLiter(s) (60 mL/Hr) IV Continuous <Continuous>      MEDICATIONS  (PRN):  meclizine 12.5 milliGRAM(s) Oral every 6 hours PRN vertigo  ondansetron Injectable 4 milliGRAM(s) IV Push every 4 hours PRN Nausea and/or Vomiting      Physical Exam    Neuro :  no focal deficits  Respiratory: CTA B/L  CV: RRR, S1S2, no murmurs,   Abdominal: Soft, NT, ND +BS,  Extremities: No edema, + peripheral pulses    ASSESSMENT    vertigo,   r/o cns patho,  ecg changes r/o acs,   s/p shakeel,   s/p hypokalemia,   h/o prostate CA ( s/p Radz,  last radiation in 2016)   Prostate cancer  HTN (hypertension)  HLD (hyperlipidemia)        PLAN      cont tele,   cont aspirin, statin,   ct head with No CT evidence of acute intracranial abnormality noted.  carotid duplex with No CT evidence of acute intracranial abnormality noted.   neuro f/u   Continue meclizine 12.5mg as needed for vertigo - can decrease PRN frequency from Q6H to Q8H as symptoms improve  PT/OT for vestibular therapy  f/u MRI Brain & IAC w/wo Gadolinium approved to evaluate for stroke or IAC pathology  Routine follow-up with Otolaryngology or Neurology  cardio f/u   ce q8 x 2 neg noted above  orthostatic bp neg this am  Norvasc d/c'd,   cont lopressor 25mg bid  echo with  Normal Left Ventricular Systolic Function,  (EF = 55 to 60%) noted above  phys tx eval noted and rec physical tx 1-2x/week While in house  cont current meds               Patient is a 75y old  Male who presents with a chief complaint of Vertigo (02 Nov 2020 14:01)    pt seen in tele [x  ], reg med floor [   ], bed [ x ], chair at bedside [   ], a+o x3 [ x, lethargic [  ],    nad [x  ]      Allergies    No Known Allergies        Vitals    T(F): 98.2 (11-03-20 @ 04:42), Max: 98.2 (11-02-20 @ 23:38)  HR: 56 (11-03-20 @ 04:42) (55 - 71)  BP: 148/81 (11-03-20 @ 04:42) (136/70 - 151/77)  RR: 18 (11-03-20 @ 04:42) (18 - 18)  SpO2: 97% (11-03-20 @ 04:42) (97% - 100%)  Wt(kg): --  CAPILLARY BLOOD GLUCOSE          Labs                          12.2   6.20  )-----------( 238      ( 02 Nov 2020 06:31 )             37.0       11-02    143  |  108  |  17  ----------------------------<  82  3.5   |  29  |  1.05    Ca    8.7      02 Nov 2020 06:31  Phos  2.5     11-02  Mg     2.2     11-02    TPro  6.5  /  Alb  3.1<L>  /  TBili  0.7  /  DBili  x   /  AST  28  /  ALT  33  /  AlkPhos  63  11-02        < from: Nuclear Stress Test-Pharmacologic (11.02.20 @ 06:27) >  IMPRESSIONS:Normal Study  * Negative ECG evidence of ischemia after IV of Lexiscan.  * Reviewof raw data shows: The study is of good technical  quality.  * The left ventricle was normal in size. Normal myocardial  perfusion scan, with no evidence of infarction or  inducible ischemia.  * Post-stress resting myocardial perfusion gated SPECT  imaging was performed (LVEF = 64 %)    < end of copied text >          Radiology Results      < from: MR Head No Cont (11.02.20 @ 14:43) >  IMPRESSION:  There is no mass, intracranial hemorrhage, or acute infarction.  Unremarkable MR evaluation of the internal auditory canals.    < end of copied text >        Meds    MEDICATIONS  (STANDING):  aspirin enteric coated 81 milliGRAM(s) Oral daily  famotidine    Tablet 20 milliGRAM(s) Oral daily  heparin   Injectable 5000 Unit(s) SubCutaneous every 12 hours  metoprolol tartrate 25 milliGRAM(s) Oral two times a day  simvastatin 10 milliGRAM(s) Oral at bedtime  sodium chloride 0.9% lock flush 3 milliLiter(s) IV Push every 8 hours  sodium chloride 0.9%. 1000 milliLiter(s) (60 mL/Hr) IV Continuous <Continuous>      MEDICATIONS  (PRN):  meclizine 12.5 milliGRAM(s) Oral every 6 hours PRN vertigo  ondansetron Injectable 4 milliGRAM(s) IV Push every 4 hours PRN Nausea and/or Vomiting      Physical Exam    Neuro :  no focal deficits  Respiratory: CTA B/L  CV: RRR, S1S2, no murmurs,   Abdominal: Soft, NT, ND +BS,  Extremities: No edema, + peripheral pulses    ASSESSMENT    vertigo,   r/o cns patho,  ecg changes r/o acs,   s/p shakeel,   s/p hypokalemia,   h/o prostate CA ( s/p Radz,  last radiation in 2016)   Prostate cancer  HTN (hypertension)  HLD (hyperlipidemia)        PLAN      cont tele,   cont aspirin, statin,   ct head with No CT evidence of acute intracranial abnormality noted.  carotid duplex with No CT evidence of acute intracranial abnormality noted.   neuro f/u   Continue meclizine 12.5mg as needed for vertigo - can decrease PRN frequency from Q6H to Q8H as symptoms improve  PT/OT for vestibular therapy  MRI Brain & IAC w/wo Gadolinium There is no mass, intracranial hemorrhage, or acute infarction. Unremarkable MR evaluation of the internal auditory canals noted above.  Routine follow-up with Otolaryngology or Neurology  cardio f/u   ce q8 x 2 neg noted above  orthostatic bp neg this am  Norvasc d/c'd,   cont lopressor 25mg bid  echo with  Normal Left Ventricular Systolic Function,  (EF = 55 to 60%) noted  phys tx eval noted and rec physical tx 1-2x/week While in house  cont current meds  pt stable for d/c

## 2020-11-03 NOTE — DISCHARGE NOTE NURSING/CASE MANAGEMENT/SOCIAL WORK - PATIENT PORTAL LINK FT
You can access the FollowMyHealth Patient Portal offered by [facility] by registering at the following website: [email or web address]. By joining [facility]’s FollowMyHealth portal, you will also be able to view your health information using other applications (apps) compatible with our system.

## 2020-11-03 NOTE — PROGRESS NOTE ADULT - ASSESSMENT
76 yo M, from home, self ambulatory with PMH OF   HTN, HLD, prostate CA ( s/p Radz,  last radiation in 2016) p/w dizziness x 2 days. Pt was in his usual state of health until2 days ago and then developed sudden onset of Vertigo when he woke up from sleep,abnormal EKG,s/p LINDSEY.  1.D/C Tele monitoring.  2.HTN-b blocker.  3.Lipid d/o-statin.  4.GI and DVT prophylaxis.

## 2020-11-03 NOTE — PROGRESS NOTE ADULT - SUBJECTIVE AND OBJECTIVE BOX
CHIEF COMPLAINT:Patient is a 75y old  Male who presents with a chief complaint of Vertigo.Pt appears comfortable.    	  REVIEW OF SYSTEMS:  CONSTITUTIONAL: No fever, weight loss, or fatigue  EYES: No eye pain, visual disturbances, or discharge  ENT:  No difficulty hearing, tinnitus, vertigo; No sinus or throat pain  NECK: No pain or stiffness  RESPIRATORY: No cough, wheezing, chills or hemoptysis; No Shortness of Breath  CARDIOVASCULAR: No chest pain, palpitations, passing out, dizziness, or leg swelling  GASTROINTESTINAL: No abdominal or epigastric pain. No nausea, vomiting, or hematemesis; No diarrhea or constipation. No melena or hematochezia.  GENITOURINARY: No dysuria, frequency, hematuria, or incontinence  NEUROLOGICAL: No headaches, memory loss, loss of strength, numbness, or tremors  SKIN: No itching, burning, rashes, or lesions   LYMPH Nodes: No enlarged glands  ENDOCRINE: No heat or cold intolerance; No hair loss  MUSCULOSKELETAL: No joint pain or swelling; No muscle, back, or extremity pain  PSYCHIATRIC: No depression, anxiety, mood swings, or difficulty sleeping  HEME/LYMPH: No easy bruising, or bleeding gums  ALLERGY AND IMMUNOLOGIC: No hives or eczema	        PHYSICAL EXAM:  T(C): 36.7 (11-03-20 @ 07:26), Max: 36.8 (11-02-20 @ 23:38)  HR: 51 (11-03-20 @ 07:26) (51 - 71)  BP: 159/71 (11-03-20 @ 07:26) (136/70 - 159/71)  RR: 18 (11-03-20 @ 07:26) (18 - 18)  SpO2: 98% (11-03-20 @ 07:26) (97% - 100%)  Wt(kg): --  I&O's Summary    02 Nov 2020 07:01  -  03 Nov 2020 07:00  --------------------------------------------------------  IN: 825 mL / OUT: 0 mL / NET: 825 mL        Appearance: Normal	  HEENT:   Normal oral mucosa, PERRL, EOMI	  Lymphatic: No lymphadenopathy  Cardiovascular: Normal S1 S2, No JVD, No murmurs, No edema  Respiratory: Lungs clear to auscultation	  Psychiatry: A & O x 3, Mood & affect appropriate  Gastrointestinal:  Soft, Non-tender, + BS	  Skin: No rashes, No ecchymoses, No cyanosis	  Neurologic: Non-focal  Extremities: Normal range of motion, No clubbing, cyanosis or edema  Vascular: Peripheral pulses palpable 2+ bilaterally    MEDICATIONS  (STANDING):  aspirin enteric coated 81 milliGRAM(s) Oral daily  famotidine    Tablet 20 milliGRAM(s) Oral daily  heparin   Injectable 5000 Unit(s) SubCutaneous every 12 hours  metoprolol tartrate 25 milliGRAM(s) Oral two times a day  simvastatin 10 milliGRAM(s) Oral at bedtime  sodium chloride 0.9% lock flush 3 milliLiter(s) IV Push every 8 hours  sodium chloride 0.9%. 1000 milliLiter(s) (60 mL/Hr) IV Continuous <Continuous>    	  	  LABS:	 	                        12.2   6.20  )-----------( 238      ( 02 Nov 2020 06:31 )             37.0     11-02    143  |  108  |  17  ----------------------------<  82  3.5   |  29  |  1.05    Ca    8.7      02 Nov 2020 06:31  Phos  2.5     11-02  Mg     2.2     11-02    TPro  6.5  /  Alb  3.1<L>  /  TBili  0.7  /  DBili  x   /  AST  28  /  ALT  33  /  AlkPhos  63  11-02    proBNP: Serum Pro-Brain Natriuretic Peptide: 40 pg/mL (10-30 @ 23:09)    Lipid Profile: Cholesterol 139  LDL --  HDL 63  TG 49      TSH: Thyroid Stimulating Hormone, Serum: 0.85 uU/mL (10-31 @ 05:44)      	  re< from: Nuclear Stress Test-Pharmacologic (11.02.20 @ 06:27) >  IMPRESSIONS:Normal Study  * Negative ECG evidence of ischemia after IV of Lexiscan.  * Reviewof raw data shows: The study is of good technical  quality.  * The left ventricle was normal in size. Normal myocardial  perfusion scan, with no evidence of infarction or  inducible ischemia.  * Post-stress resting myocardial perfusion gated SPECT  imaging was performed (LVEF = 64 %)              < from: MR Head No Cont (11.02.20 @ 14:43) >  EXAM:  MR BRAIN                          EXAM:  MR IAC ONLY                            PROCEDURE DATE:  11/02/2020          INTERPRETATION:  STUDY: MR BRAIN WITHOUT CONTRAST.  MR BRAIN AND INTERNAL AUDITORY CANAL      CLINICAL INDICATION: IAC pathology. Multiple episodes of dizziness.    TECHNIQUE: Multiplanar, multisequence MR imaging of the brain was performed.    COMPARISON: CT head 10/31/2020    FINDINGS:    There is no evidence of mass within either internal auditory canal.  No signal abnormalities is seen related to either vestibule or cochlea. Semicircular canals appear unremarkable.    The ventricles and sulci are age appropriate . There are mild patchy areas of T2 hyperintensity within the periventricular and subcortical white matter which are non specific and may be related to chronic microvascular ischemic changes.There is no evidence of mass, intracranial hemorrhage, or acute infarction. There is no extra axial collection. No midline shift or other significant mass effect isnoted.    There is normal flow-void within major cranial vessels suggestive of their patency.    There has been prior bilateral cataract surgery. There is patchy mild mucosal thickening within the ethmoid sinuses. The mastoid air cells are predominantly clear.    IMPRESSION:  There is no mass, intracranial hemorrhage, or acute infarction.  Unremarkable MR evaluation of the internal auditory canals.

## 2021-06-15 NOTE — H&P ADULT - PROBLEM/PLAN-2
hydrOXYzine (VISTARIL) 50 MG capsule      Last Written Prescription Date:  5/21/21  Last Fill Quantity: 120,   # refills: 0  Last Office Visit: 6/7/21  Future Office visit:    Next 5 appointments (look out 90 days)    Jun 17, 2021  2:10 PM  Return Visit with Shamar Escamilla DC  [facility] [place] [facility] [address] [place] [address]  [place] [address]  [phone]   Jun 21, 2021  2:20 PM  Return Visit with Shamar Escamilla DC  [facility] ([facility] [place] [facility] [address] [place] [address]  [place] [address]  [phone]   Jun 24, 2021  2:20 PM  Return Visit with Shamar Escamilla DC  [facility] [place] [facility] [address] [place] [address]  [place] [address]  [phone]           dronabinol (MARINOL) 2.5 MG capsule     Last Written Prescription Date:  5/5/21  Last Fill Quantity: 60,   # refills: 0  Last Office Visit: 6/7/21  Future Office visit:    Next 5 appointments (look out 90 days)    Jun 17, 2021  2:10 PM  Return Visit with Shamar Escamilla DC  [facility] [place] [facility] [address] [place] [address]  [place] [address]  [phone]   Jun 21, 2021  2:20 PM  Return Visit with Shamar Esacmilla DC  [facility] [place] [facility] [address] [place] [address]  [place] [address]  [phone]   Jun 24, 2021  2:20 PM  Return Visit with Shamar Escamilla DC  [facility] [place] [facility] [address] [place] [address]  [place] [address]  [phone]           Routing refill request to provider for review/approval       
DISPLAY PLAN FREE TEXT

## 2023-11-07 NOTE — ED ADULT NURSE NOTE - NSFALLRSKASSESSDT_ED_ALL_ED
30-Oct-2020 23:15
Diet, Regular:   Consistent Carbohydrate {Evening Snack} (CSTCHOSN)  DASH/TLC {Sodium & Cholesterol Restricted} (DASH)  For patients receiving Renal Replacement - No Protein Restr, No Conc K, No Conc Phos, Low Sodium (RENAL) (11-05-23 @ 21:10) [Active]